# Patient Record
Sex: FEMALE | Race: OTHER | Employment: FULL TIME | URBAN - METROPOLITAN AREA
[De-identification: names, ages, dates, MRNs, and addresses within clinical notes are randomized per-mention and may not be internally consistent; named-entity substitution may affect disease eponyms.]

---

## 2017-04-17 ENCOUNTER — HOSPITAL ENCOUNTER (EMERGENCY)
Age: 22
Discharge: HOME | End: 2017-04-17
Attending: EMERGENCY MEDICINE
Payer: COMMERCIAL

## 2017-04-17 ENCOUNTER — APPOINTMENT (OUTPATIENT)
Dept: CT IMAGING | Age: 22
End: 2017-04-17
Payer: COMMERCIAL

## 2017-04-17 VITALS
OXYGEN SATURATION: 100 % | DIASTOLIC BLOOD PRESSURE: 73 MMHG | RESPIRATION RATE: 18 BRPM | TEMPERATURE: 98.7 F | HEART RATE: 60 BPM | HEIGHT: 63 IN | BODY MASS INDEX: 29.46 KG/M2 | WEIGHT: 166.25 LBS | SYSTOLIC BLOOD PRESSURE: 135 MMHG

## 2017-04-17 DIAGNOSIS — S01.01XA SCALP LACERATION, INITIAL ENCOUNTER: ICD-10-CM

## 2017-04-17 DIAGNOSIS — S09.90XA HEAD INJURY, INITIAL ENCOUNTER: Primary | ICD-10-CM

## 2017-04-17 PROCEDURE — 6370000000 HC RX 637 (ALT 250 FOR IP): Performed by: EMERGENCY MEDICINE

## 2017-04-17 PROCEDURE — 70450 CT HEAD/BRAIN W/O DYE: CPT

## 2017-04-17 PROCEDURE — 99283 EMERGENCY DEPT VISIT LOW MDM: CPT

## 2017-04-17 RX ADMIN — Medication: at 16:08

## 2017-04-17 ASSESSMENT — PAIN SCALES - GENERAL: PAINLEVEL_OUTOF10: 10

## 2017-04-17 ASSESSMENT — PAIN DESCRIPTION - DESCRIPTORS: DESCRIPTORS: ACHING

## 2017-04-17 ASSESSMENT — PAIN DESCRIPTION - PAIN TYPE: TYPE: ACUTE PAIN

## 2017-04-24 ENCOUNTER — HOSPITAL ENCOUNTER (EMERGENCY)
Age: 22
Discharge: HOME | End: 2017-04-24
Attending: EMERGENCY MEDICINE
Payer: COMMERCIAL

## 2017-04-24 VITALS
RESPIRATION RATE: 14 BRPM | SYSTOLIC BLOOD PRESSURE: 140 MMHG | OXYGEN SATURATION: 100 % | BODY MASS INDEX: 27.57 KG/M2 | WEIGHT: 171.52 LBS | TEMPERATURE: 98.2 F | HEART RATE: 68 BPM | HEIGHT: 66 IN | DIASTOLIC BLOOD PRESSURE: 96 MMHG

## 2017-04-24 DIAGNOSIS — Z48.02 ENCOUNTER FOR STAPLE REMOVAL: ICD-10-CM

## 2017-04-24 DIAGNOSIS — N39.0 URINARY TRACT INFECTION WITHOUT HEMATURIA, SITE UNSPECIFIED: Primary | ICD-10-CM

## 2017-04-24 LAB
-: ABNORMAL
AMORPHOUS: ABNORMAL
BACTERIA: ABNORMAL
BILIRUBIN URINE: NEGATIVE
CASTS UA: ABNORMAL /LPF
COLOR: YELLOW
COMMENT UA: ABNORMAL
CRYSTALS, UA: ABNORMAL /HPF
EPITHELIAL CELLS UA: ABNORMAL /HPF
GLUCOSE URINE: NEGATIVE
KETONES, URINE: NEGATIVE
LEUKOCYTE ESTERASE, URINE: ABNORMAL
MUCUS: ABNORMAL
NITRITE, URINE: NEGATIVE
OTHER OBSERVATIONS UA: ABNORMAL
PH UA: 8 (ref 5–8)
PROTEIN UA: ABNORMAL
RBC UA: ABNORMAL /HPF (ref 0–2)
RENAL EPITHELIAL, UA: ABNORMAL /HPF
SPECIFIC GRAVITY UA: 1.02 (ref 1–1.03)
TRICHOMONAS: ABNORMAL
TURBIDITY: ABNORMAL
URINE HGB: ABNORMAL
UROBILINOGEN, URINE: NORMAL
WBC UA: ABNORMAL /HPF (ref 0–5)
YEAST: ABNORMAL

## 2017-04-24 PROCEDURE — 84703 CHORIONIC GONADOTROPIN ASSAY: CPT

## 2017-04-24 PROCEDURE — 87186 SC STD MICRODIL/AGAR DIL: CPT

## 2017-04-24 PROCEDURE — 99283 EMERGENCY DEPT VISIT LOW MDM: CPT

## 2017-04-24 PROCEDURE — 87077 CULTURE AEROBIC IDENTIFY: CPT

## 2017-04-24 PROCEDURE — 81001 URINALYSIS AUTO W/SCOPE: CPT

## 2017-04-24 PROCEDURE — 87086 URINE CULTURE/COLONY COUNT: CPT

## 2017-04-24 RX ORDER — NITROFURANTOIN 25; 75 MG/1; MG/1
100 CAPSULE ORAL 2 TIMES DAILY
Qty: 20 CAPSULE | Refills: 0 | Status: SHIPPED | OUTPATIENT
Start: 2017-04-24 | End: 2017-05-04

## 2017-04-24 ASSESSMENT — ENCOUNTER SYMPTOMS
SORE THROAT: 0
COUGH: 0
NAUSEA: 0
SINUS PRESSURE: 0
CONSTIPATION: 0
WHEEZING: 0
DIARRHEA: 0
SHORTNESS OF BREATH: 0
COLOR CHANGE: 0
RHINORRHEA: 0
ABDOMINAL PAIN: 0
VOMITING: 0

## 2017-04-24 ASSESSMENT — PAIN SCALES - GENERAL: PAINLEVEL_OUTOF10: 8

## 2017-04-25 LAB
CULTURE: ABNORMAL
CULTURE: ABNORMAL
HCG, PREGNANCY URINE (POC): NEGATIVE
Lab: ABNORMAL
ORGANISM: ABNORMAL
SPECIMEN DESCRIPTION: ABNORMAL
SPECIMEN DESCRIPTION: ABNORMAL
STATUS: ABNORMAL

## 2017-08-09 ENCOUNTER — HOSPITAL ENCOUNTER (OUTPATIENT)
Age: 22
Setting detail: SPECIMEN
Discharge: HOME | End: 2017-08-09
Payer: MEDICAID

## 2017-08-09 ENCOUNTER — OFFICE VISIT (OUTPATIENT)
Dept: OBGYN CLINIC | Age: 22
End: 2017-08-09
Payer: COMMERCIAL

## 2017-08-09 VITALS
HEIGHT: 62 IN | SYSTOLIC BLOOD PRESSURE: 119 MMHG | HEART RATE: 79 BPM | DIASTOLIC BLOOD PRESSURE: 70 MMHG | RESPIRATION RATE: 16 BRPM | BODY MASS INDEX: 30.18 KG/M2 | WEIGHT: 164 LBS

## 2017-08-09 DIAGNOSIS — Z11.4 ENCOUNTER FOR SCREENING FOR HIV: ICD-10-CM

## 2017-08-09 DIAGNOSIS — R87.612 LOW GRADE SQUAMOUS INTRAEPITHELIAL LESION (LGSIL) ON PAPANICOLAOU SMEAR OF CERVIX: Primary | ICD-10-CM

## 2017-08-09 DIAGNOSIS — N76.0 ACUTE VAGINITIS: ICD-10-CM

## 2017-08-09 LAB — HIV AG/AB: NONREACTIVE

## 2017-08-09 PROCEDURE — 99213 OFFICE O/P EST LOW 20 MIN: CPT | Performed by: SPECIALIST

## 2017-08-09 RX ORDER — METRONIDAZOLE 500 MG/1
500 TABLET ORAL 2 TIMES DAILY
Qty: 14 TABLET | Refills: 0 | Status: SHIPPED | OUTPATIENT
Start: 2017-08-09 | End: 2017-08-16

## 2017-08-09 RX ORDER — FLUCONAZOLE 100 MG/1
100 TABLET ORAL DAILY
Qty: 7 TABLET | Refills: 0 | Status: SHIPPED | OUTPATIENT
Start: 2017-08-09 | End: 2017-08-16

## 2017-08-09 RX ORDER — VENLAFAXINE HYDROCHLORIDE 150 MG/1
150 CAPSULE, EXTENDED RELEASE ORAL DAILY
COMMUNITY
End: 2017-12-27

## 2017-08-09 ASSESSMENT — ENCOUNTER SYMPTOMS
EYE PAIN: 0
NAUSEA: 0
ABDOMINAL DISTENTION: 0
DIARRHEA: 0
ABDOMINAL PAIN: 0
CONSTIPATION: 0
COUGH: 0
VOMITING: 0
APNEA: 0

## 2017-08-21 LAB — CYTOLOGY REPORT: NORMAL

## 2017-12-14 ENCOUNTER — TELEPHONE (OUTPATIENT)
Dept: BARIATRICS/WEIGHT MGMT | Age: 22
End: 2017-12-14

## 2017-12-27 ENCOUNTER — HOSPITAL ENCOUNTER (EMERGENCY)
Age: 22
Discharge: HOME | End: 2017-12-27
Attending: EMERGENCY MEDICINE
Payer: COMMERCIAL

## 2017-12-27 VITALS
BODY MASS INDEX: 33.49 KG/M2 | WEIGHT: 189 LBS | TEMPERATURE: 97.1 F | HEART RATE: 68 BPM | OXYGEN SATURATION: 100 % | SYSTOLIC BLOOD PRESSURE: 134 MMHG | DIASTOLIC BLOOD PRESSURE: 59 MMHG | RESPIRATION RATE: 16 BRPM | HEIGHT: 63 IN

## 2017-12-27 DIAGNOSIS — B08.5 HERPANGINA: Primary | ICD-10-CM

## 2017-12-27 PROCEDURE — 96372 THER/PROPH/DIAG INJ SC/IM: CPT

## 2017-12-27 PROCEDURE — 6360000002 HC RX W HCPCS: Performed by: NURSE PRACTITIONER

## 2017-12-27 PROCEDURE — 99282 EMERGENCY DEPT VISIT SF MDM: CPT

## 2017-12-27 RX ORDER — TRAZODONE HYDROCHLORIDE 100 MG/1
100 TABLET ORAL NIGHTLY
COMMUNITY
End: 2018-11-01

## 2017-12-27 RX ORDER — GABAPENTIN 100 MG/1
100 CAPSULE ORAL 3 TIMES DAILY
COMMUNITY
End: 2018-04-11 | Stop reason: ALTCHOICE

## 2017-12-27 RX ORDER — DEXAMETHASONE SODIUM PHOSPHATE 10 MG/ML
10 INJECTION INTRAMUSCULAR; INTRAVENOUS ONCE
Status: COMPLETED | OUTPATIENT
Start: 2017-12-27 | End: 2017-12-27

## 2017-12-27 RX ADMIN — DEXAMETHASONE SODIUM PHOSPHATE 10 MG: 10 INJECTION INTRAMUSCULAR; INTRAVENOUS at 01:30

## 2017-12-27 ASSESSMENT — PAIN DESCRIPTION - LOCATION: LOCATION: THROAT

## 2017-12-27 ASSESSMENT — PAIN SCALES - GENERAL: PAINLEVEL_OUTOF10: 9

## 2017-12-27 ASSESSMENT — PAIN DESCRIPTION - PAIN TYPE: TYPE: ACUTE PAIN

## 2017-12-27 NOTE — ED NOTES
Pt presents to ED ambulatory with steady gait c/o of throat pain and bilateral ear pain for the past 2 days. Pt rates pain 9/10. Pt denies fever or chills.       Rogene Severance, RN  12/27/17 8835

## 2017-12-27 NOTE — ED PROVIDER NOTES
[address] [place] [address] ED  eMERGENCY dEPARTMENT eNCOUnter      Pt Name: Gideon Geiger  MRN: [id number]  [place] 1995  Date of evaluation: 12/27/2017  Provider: Dariana Daniel, NP    [address] [place] [address]       Chief Complaint   Patient presents with    Pharyngitis    Otalgia     bilateral          HISTORY OF PRESENT ILLNESS  (Location/Symptom, Timing/Onset, Context/Setting, Quality, Duration, Modifying Factors, Severity.)   Gideon Geiger is a 20 y.o. female who presents to the emergency department with a two day history of very sore throat, bilateral earache, stuffy nose and nonproductive cough. She states the symptoms have become worse. Swallowing aggravates the pain. She is still taking fluids and food without difficulty and swallowing. She is taken Robitussin for the symptoms without relief. No sick contacts. Nursing Notes were reviewed. 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 Venegas Melena    Depression     Hematuria     History of chest pain     Hyperinsulinism     Hypertension     Insulin resistance     Leg lesion     Microcytic anemia     Obesity     Pain     RIGHT KNEE/HX DISLOCATION SEVERAL TIMES    S/P laparoscopic sleeve gastrectomy 12-22-14    start wt = 237lb, Dr. Aviva Mcbride           Procedure Laterality Date    BARIATRIC SURGERY      OTHER SURGICAL HISTORY  12/22/14    laparascopic Robotic Gastrectomy sleeve 01:20:22 AM      PATIENT REFERRED TO:   No follow-up provider specified.     DISCHARGE MEDICATIONS:     Discharge Medication List as of 12/27/2017  1:21 AM      START taking these medications    Details   Magic Mouthwash (MIRACLE MOUTHWASH) Swish and spit 5 mLs 4 times daily as needed for Irritation, Disp-300 mL, R-0Print                 (Please note that portions of this note were completed with a voice recognition program.  Efforts were made to edit the dictations but occasionally words are mis-transcribed.)    Soheila Rivera NP  Certified Nurse Practitioner       Soheila Rivera NP  12/27/17 [phone]

## 2018-04-11 ENCOUNTER — OFFICE VISIT (OUTPATIENT)
Dept: FAMILY MEDICINE CLINIC | Age: 23
End: 2018-04-11
Payer: COMMERCIAL

## 2018-04-11 VITALS
HEART RATE: 86 BPM | RESPIRATION RATE: 24 BRPM | WEIGHT: 190 LBS | OXYGEN SATURATION: 99 % | SYSTOLIC BLOOD PRESSURE: 124 MMHG | BODY MASS INDEX: 33.66 KG/M2 | TEMPERATURE: 97.7 F | HEIGHT: 63 IN | DIASTOLIC BLOOD PRESSURE: 78 MMHG

## 2018-04-11 DIAGNOSIS — F50.9 SELF-INDUCED PURGING FOR WEIGHT LOSS: ICD-10-CM

## 2018-04-11 DIAGNOSIS — Z76.89 ENCOUNTER TO ESTABLISH CARE: ICD-10-CM

## 2018-04-11 DIAGNOSIS — Z13.1 SCREENING FOR DIABETES MELLITUS (DM): ICD-10-CM

## 2018-04-11 DIAGNOSIS — D69.1 DELTA STORAGE POOL DISEASE (HCC): Primary | Chronic | ICD-10-CM

## 2018-04-11 LAB — HBA1C MFR BLD: 5.4 %

## 2018-04-11 PROCEDURE — 99203 OFFICE O/P NEW LOW 30 MIN: CPT | Performed by: NURSE PRACTITIONER

## 2018-04-11 PROCEDURE — 83036 HEMOGLOBIN GLYCOSYLATED A1C: CPT | Performed by: NURSE PRACTITIONER

## 2018-04-11 RX ORDER — CARIPRAZINE 1.5 MG/1
CAPSULE, GELATIN COATED ORAL
Refills: 0 | COMMUNITY
Start: 2018-03-21 | End: 2018-11-01

## 2018-04-11 RX ORDER — ESCITALOPRAM OXALATE 20 MG/1
TABLET ORAL
Refills: 0 | COMMUNITY
Start: 2018-03-20 | End: 2018-11-01

## 2018-04-11 RX ORDER — LANOLIN ALCOHOL/MO/W.PET/CERES
325 CREAM (GRAM) TOPICAL 2 TIMES DAILY
Qty: 60 TABLET | Refills: 3 | Status: SHIPPED | OUTPATIENT
Start: 2018-04-11 | End: 2018-11-01

## 2018-04-11 ASSESSMENT — PATIENT HEALTH QUESTIONNAIRE - PHQ9
SUM OF ALL RESPONSES TO PHQ QUESTIONS 1-9: 0
1. LITTLE INTEREST OR PLEASURE IN DOING THINGS: 0
SUM OF ALL RESPONSES TO PHQ9 QUESTIONS 1 & 2: 0
2. FEELING DOWN, DEPRESSED OR HOPELESS: 0

## 2018-04-11 ASSESSMENT — ENCOUNTER SYMPTOMS
SHORTNESS OF BREATH: 0
COUGH: 0

## 2018-08-06 VITALS
HEART RATE: 72 BPM | WEIGHT: 201 LBS | RESPIRATION RATE: 16 BRPM | HEIGHT: 63 IN | DIASTOLIC BLOOD PRESSURE: 67 MMHG | OXYGEN SATURATION: 100 % | BODY MASS INDEX: 35.61 KG/M2 | TEMPERATURE: 98.5 F | SYSTOLIC BLOOD PRESSURE: 132 MMHG

## 2018-08-06 ASSESSMENT — PAIN DESCRIPTION - ORIENTATION: ORIENTATION: RIGHT

## 2018-08-06 ASSESSMENT — PAIN DESCRIPTION - ONSET: ONSET: ON-GOING

## 2018-08-06 ASSESSMENT — PAIN DESCRIPTION - FREQUENCY: FREQUENCY: CONTINUOUS

## 2018-08-06 ASSESSMENT — PAIN SCALES - GENERAL: PAINLEVEL_OUTOF10: 9

## 2018-08-06 ASSESSMENT — PAIN DESCRIPTION - PROGRESSION: CLINICAL_PROGRESSION: NOT CHANGED

## 2018-08-06 ASSESSMENT — PAIN DESCRIPTION - LOCATION: LOCATION: KNEE

## 2018-08-06 ASSESSMENT — PAIN DESCRIPTION - PAIN TYPE: TYPE: ACUTE PAIN

## 2018-08-07 ENCOUNTER — HOSPITAL ENCOUNTER (EMERGENCY)
Age: 23
Discharge: HOME | End: 2018-08-07
Attending: EMERGENCY MEDICINE

## 2018-08-07 ENCOUNTER — APPOINTMENT (OUTPATIENT)
Dept: GENERAL RADIOLOGY | Age: 23
End: 2018-08-07

## 2018-08-07 DIAGNOSIS — M23.91 INTERNAL DERANGEMENT OF KNEE, RIGHT: Primary | ICD-10-CM

## 2018-08-07 LAB — HCG, PREGNANCY URINE (POC): NEGATIVE

## 2018-08-07 PROCEDURE — 73562 X-RAY EXAM OF KNEE 3: CPT

## 2018-08-07 PROCEDURE — 6370000000 HC RX 637 (ALT 250 FOR IP): Performed by: NURSE PRACTITIONER

## 2018-08-07 PROCEDURE — 99283 EMERGENCY DEPT VISIT LOW MDM: CPT

## 2018-08-07 PROCEDURE — 84703 CHORIONIC GONADOTROPIN ASSAY: CPT

## 2018-08-07 RX ORDER — IBUPROFEN 800 MG/1
800 TABLET ORAL EVERY 8 HOURS PRN
Qty: 20 TABLET | Refills: 0 | Status: SHIPPED | OUTPATIENT
Start: 2018-08-07 | End: 2018-11-01

## 2018-08-07 RX ORDER — ACETAMINOPHEN AND CODEINE PHOSPHATE 300; 30 MG/1; MG/1
1 TABLET ORAL EVERY 6 HOURS PRN
Qty: 12 TABLET | Refills: 0 | Status: SHIPPED | OUTPATIENT
Start: 2018-08-07 | End: 2018-08-10

## 2018-08-07 RX ORDER — IBUPROFEN 800 MG/1
800 TABLET ORAL ONCE
Status: COMPLETED | OUTPATIENT
Start: 2018-08-07 | End: 2018-08-07

## 2018-08-07 RX ADMIN — IBUPROFEN 800 MG: 800 TABLET ORAL at 01:53

## 2018-08-07 ASSESSMENT — ENCOUNTER SYMPTOMS: COLOR CHANGE: 0

## 2018-08-07 ASSESSMENT — PAIN SCALES - GENERAL: PAINLEVEL_OUTOF10: 10

## 2018-08-07 NOTE — ED NOTES
Pt to tx room via wheelchair. Pt states that she \"dislocated\" her knee Saturday. Pt states that she was walking and twisted wrong. Pt states that she has done this in the past but it has always gotten better. Pt states that this time it is not getting better. Pt has knee wrapped in compression type bandage. NAD noted.       Conner Price RN  08/07/18 6344

## 2018-08-07 NOTE — ED PROVIDER NOTES
[facility] ED  eMERGENCY dEPARTMENT eNCOUnter      Pt Name: Nikko Taylor  MRN: [id number]  [place] 1995  Date of evaluation: 8/6/2018  Provider: Yazmin Chou       Chief Complaint   Patient presents with    Knee Pain     right knee. pt states she dislocated it on Saturday. hx of dislocation. back in place however still having severe pain         HISTORY OF PRESENT ILLNESS  (Location/Symptom, Timing/Onset, Context/Setting, Quality, Duration, Modifying Factors, Severity.)   Nikko Taylor is a 20 y.o. female who presents to the emergency department By private auto for evaluation of right knee pain and swelling. Patient states she was walking Saturday when her knee gave out on her and she dislocated it but it went back in on its own. .  Patient states she has a history of knee dislocations. She saw an orthopedic specialist several years ago but nobody recently. She rates her knee pain 9 out of 10. It is aggravated with walking. Nursing Notes were reviewed. 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 Angelo Romano    Depression     Hematuria     History of chest pain     Hyperinsulinism     Hypertension     Insulin resistance     Leg lesion     Microcytic anemia     Obesity     Pain     RIGHT KNEE/HX DISLOCATION SEVERAL TIMES    S/P laparoscopic sleeve gastrectomy 12-22-14    start wt = 237lb, Dr. Brian Cantu       Past Surgical History:   Procedure Laterality Date    BARIATRIC SURGERY      OTHER SURGICAL HISTORY  12/22/14    laparascopic Robotic Gastrectomy sleeve    TONSILLECTOMY           CURRENT MEDICATIONS       Previous Medications    ESCITALOPRAM (LEXAPRO) 20 MG TABLET    take 1 tablet by mouth once daily    FERROUS SULFATE (FE TABS) 325 (65 FE) MG EC TABLET    Take 1 tablet by mouth 2 Ear: External ear normal.   Nose: Nose normal.   Mouth/Throat: Oropharynx is clear and moist.   Eyes: Conjunctivae and EOM are normal. Pupils are equal, round, and reactive to light. Neck: Normal range of motion. Neck supple. Pulmonary/Chest: Effort normal. No respiratory distress. Musculoskeletal:        Right knee: She exhibits decreased range of motion and swelling. She exhibits no deformity. Tenderness found. Medial joint line and lateral joint line tenderness noted. Legs:  Neurological: She is alert and oriented to person, place, and time. She has normal reflexes. No cranial nerve deficit. Coordination normal.   Skin: Skin is warm, dry and intact. No ecchymosis and no rash noted. No erythema. Psychiatric: She has a normal mood and affect. Her behavior is normal. Judgment and thought content normal.         DIAGNOSTIC RESULTS     EKG: All EKG's are interpreted by the Emergency Department Physician who either signs or Co-signs this chart in the absence of a cardiologist.        RADIOLOGY:   Non-plain film images such as CT, Ultrasound and MRI are read by the radiologist. Micah Salazar radiographic images are visualized and preliminarily interpreted by the emergency physician with the below findings:    Xr Knee Right (3 Views)    Result Date: 8/7/2018  EXAMINATION: 3 XRAY VIEWS OF THE RIGHT KNEE 8/7/2018 12:38 am COMPARISON: August 9, 2016 HISTORY: ORDERING SYSTEM PROVIDED HISTORY: Pain and swelling ×3 days. Patient states she dislocated her knee Saturday. TECHNOLOGIST PROVIDED HISTORY: Reason for exam:->Pain and swelling ×3 days. Patient states she dislocated her knee Saturday. Ordering Physician Provided Reason for Exam: Rt knee pain and swelling Acuity: Acute Type of Exam: Initial Mechanism of Injury: dislocation Sat FINDINGS: Stable tibial tubercle minimally distracted fragment which may be sequela of remote Osgood-Schlatter disease. No evidence of acute fracture or dislocation.   No focal osseous ACETAMINOPHEN-CODEINE (TYLENOL/CODEINE #3) 300-30 MG PER TABLET    Take 1 tablet by mouth every 6 hours as needed for Pain for up to 3 days. .    IBUPROFEN (ADVIL;MOTRIN) 800 MG TABLET    Take 1 tablet by mouth every 8 hours as needed for Pain     Electronically signed by BENY Campuzano - CNP on 8/7/2018 at 2:07 AM           BENY Campuzano - CNP  08/07/18 [id number]

## 2018-11-01 ENCOUNTER — OFFICE VISIT (OUTPATIENT)
Dept: FAMILY MEDICINE CLINIC | Age: 23
End: 2018-11-01
Payer: COMMERCIAL

## 2018-11-01 VITALS
SYSTOLIC BLOOD PRESSURE: 120 MMHG | HEART RATE: 83 BPM | WEIGHT: 204.8 LBS | TEMPERATURE: 98.6 F | DIASTOLIC BLOOD PRESSURE: 62 MMHG | OXYGEN SATURATION: 97 % | BODY MASS INDEX: 36.28 KG/M2

## 2018-11-01 DIAGNOSIS — Z13.1 SCREENING FOR DIABETES MELLITUS: ICD-10-CM

## 2018-11-01 DIAGNOSIS — M25.561 CHRONIC PAIN OF RIGHT KNEE: Primary | ICD-10-CM

## 2018-11-01 DIAGNOSIS — G89.29 CHRONIC PAIN OF RIGHT KNEE: Primary | ICD-10-CM

## 2018-11-01 PROBLEM — F33.2 SEVERE RECURRENT MAJOR DEPRESSION WITHOUT PSYCHOTIC FEATURES (HCC): Status: ACTIVE | Noted: 2017-05-03

## 2018-11-01 PROBLEM — D50.9 IRON DEFICIENCY ANEMIA: Status: ACTIVE | Noted: 2017-05-04
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
## 2018-11-01 LAB — HBA1C MFR BLD: 5.2 %

## 2018-11-01 PROCEDURE — 99213 OFFICE O/P EST LOW 20 MIN: CPT | Performed by: NURSE PRACTITIONER

## 2018-11-01 PROCEDURE — 83036 HEMOGLOBIN GLYCOSYLATED A1C: CPT | Performed by: NURSE PRACTITIONER

## 2018-11-01 ASSESSMENT — ENCOUNTER SYMPTOMS
SHORTNESS OF BREATH: 0
COUGH: 0

## 2018-11-07 ENCOUNTER — OFFICE VISIT (OUTPATIENT)
Dept: ORTHOPEDIC SURGERY | Age: 23
End: 2018-11-07
Payer: COMMERCIAL

## 2018-11-07 VITALS
BODY MASS INDEX: 35.44 KG/M2 | HEIGHT: 63 IN | DIASTOLIC BLOOD PRESSURE: 69 MMHG | SYSTOLIC BLOOD PRESSURE: 120 MMHG | WEIGHT: 200 LBS | HEART RATE: 63 BPM

## 2018-11-07 DIAGNOSIS — S83.241A ACUTE MEDIAL MENISCUS TEAR OF RIGHT KNEE, INITIAL ENCOUNTER: Primary | ICD-10-CM

## 2018-11-07 PROCEDURE — 99203 OFFICE O/P NEW LOW 30 MIN: CPT | Performed by: ORTHOPAEDIC SURGERY

## 2018-11-15 ENCOUNTER — TELEPHONE (OUTPATIENT)
Dept: ORTHOPEDIC SURGERY | Age: 23
End: 2018-11-15

## 2018-12-12 ENCOUNTER — OFFICE VISIT (OUTPATIENT)
Dept: ORTHOPEDIC SURGERY | Age: 23
End: 2018-12-12
Payer: COMMERCIAL

## 2018-12-12 VITALS — HEIGHT: 62 IN | BODY MASS INDEX: 36.8 KG/M2 | WEIGHT: 200 LBS

## 2018-12-12 DIAGNOSIS — M25.561 PATELLOFEMORAL ARTHRALGIA OF RIGHT KNEE: Primary | ICD-10-CM

## 2018-12-12 PROCEDURE — 99213 OFFICE O/P EST LOW 20 MIN: CPT | Performed by: ORTHOPAEDIC SURGERY

## 2018-12-12 NOTE — PROGRESS NOTES
Subjective:      Patient ID: Mirza Mcclain is a 20 y.o. female. HPI  Patient comes in for further evaluation of her right knee. Continues to have diffuse pain medial and lateral.  Worse with weightbearing. Deep achy pain that does not radiate. No current outpatient prescriptions on file. No current facility-administered medications for this visit. Review of Systems   Musculoskeletal: Positive for arthralgias, gait problem and joint swelling. 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 Lupe Ulrich    Depression     Hematuria     History of chest pain     Hyperinsulinism     Hypertension     Insulin resistance     Leg lesion     Microcytic anemia     Obesity     Pain     RIGHT KNEE/HX DISLOCATION SEVERAL TIMES    S/P laparoscopic sleeve gastrectomy 12-22-14    start wt = 237lb, Dr. Kate Vargas     Past Surgical History:   Procedure Laterality Date    BARIATRIC SURGERY      OTHER SURGICAL HISTORY  12/22/14    laparascopic Robotic Gastrectomy sleeve    TONSILLECTOMY       Family History   Problem Relation Age of Onset    Bipolar Disorder Mother     Arthritis Mother     Depression Mother     High Blood Pressure Mother     High Cholesterol Mother     Mental Illness Mother     Stroke Mother     Clotting Disorder Father     Depression Sister     Diabetes Maternal Aunt     Cancer Maternal Grandmother         pheochromocytoma- 61     Social History   Substance Use Topics    Smoking status: Former Smoker     Packs/day: 0.50     Years: 1.00     Types: Cigarettes     Quit date: 6/1/2014    Smokeless tobacco: Never Used    Alcohol use Yes      Comment: social       Objective:     Vitals:    12/12/18 0937   Weight: 200 lb (90.7 kg)   Height: 5' 2\" (1.575 m)     Physical Exam  On exam patient's alert and oriented ×3 and appears well kempt she walks with a normal gait.   Exam of

## 2018-12-26 ENCOUNTER — OFFICE VISIT (OUTPATIENT)
Dept: FAMILY MEDICINE CLINIC | Age: 23
End: 2018-12-26
Payer: COMMERCIAL

## 2018-12-26 VITALS
TEMPERATURE: 98 F | OXYGEN SATURATION: 98 % | SYSTOLIC BLOOD PRESSURE: 115 MMHG | DIASTOLIC BLOOD PRESSURE: 70 MMHG | BODY MASS INDEX: 37.7 KG/M2 | HEART RATE: 70 BPM | WEIGHT: 212.8 LBS | HEIGHT: 63 IN

## 2018-12-26 DIAGNOSIS — J01.00 ACUTE NON-RECURRENT MAXILLARY SINUSITIS: Primary | ICD-10-CM

## 2018-12-26 PROCEDURE — 99213 OFFICE O/P EST LOW 20 MIN: CPT | Performed by: FAMILY MEDICINE

## 2018-12-26 RX ORDER — AZITHROMYCIN 250 MG/1
TABLET, FILM COATED ORAL
Qty: 1 PACKET | Refills: 0 | Status: SHIPPED | OUTPATIENT
Start: 2018-12-26 | End: 2019-11-15 | Stop reason: CLARIF

## 2018-12-26 RX ORDER — ESCITALOPRAM OXALATE 20 MG/1
TABLET ORAL
Refills: 0 | COMMUNITY
Start: 2018-11-16 | End: 2019-11-15 | Stop reason: CLARIF

## 2018-12-26 RX ORDER — GUAIFENESIN 600 MG/1
600 TABLET, EXTENDED RELEASE ORAL 2 TIMES DAILY
Qty: 14 TABLET | Refills: 1 | Status: SHIPPED | OUTPATIENT
Start: 2018-12-26 | End: 2019-01-02

## 2018-12-26 RX ORDER — ERGOCALCIFEROL 1.25 MG/1
CAPSULE ORAL
Refills: 0 | COMMUNITY
Start: 2018-11-16 | End: 2019-11-15 | Stop reason: CLARIF

## 2018-12-26 RX ORDER — TRAZODONE HYDROCHLORIDE 100 MG/1
TABLET ORAL
Refills: 0 | COMMUNITY
Start: 2018-11-16 | End: 2019-11-15 | Stop reason: CLARIF

## 2018-12-26 RX ORDER — FLUTICASONE PROPIONATE 50 MCG
1 SPRAY, SUSPENSION (ML) NASAL 2 TIMES DAILY
Qty: 1 BOTTLE | Refills: 2 | Status: SHIPPED | OUTPATIENT
Start: 2018-12-26 | End: 2019-11-15 | Stop reason: CLARIF

## 2018-12-26 ASSESSMENT — ENCOUNTER SYMPTOMS
EYES NEGATIVE: 1
RHINORRHEA: 1
SHORTNESS OF BREATH: 0
HOARSE VOICE: 1
SINUS PAIN: 1
ALLERGIC/IMMUNOLOGIC NEGATIVE: 1
SINUS PRESSURE: 1
SWOLLEN GLANDS: 1
GASTROINTESTINAL NEGATIVE: 1
COUGH: 1
SORE THROAT: 1

## 2018-12-26 NOTE — PROGRESS NOTES
RECEIVING PLATELETS PREOP PER DR. Vidhya Grey    Depression     Hematuria     History of chest pain     Hyperinsulinism     Hypertension     Insulin resistance     Leg lesion     Microcytic anemia     Obesity     Pain     RIGHT KNEE/HX DISLOCATION SEVERAL TIMES    S/P laparoscopic sleeve gastrectomy 12-22-14    start wt = 237lb, Dr. Virginia Blankenship      Past Surgical History:   Procedure Laterality Date    BARIATRIC SURGERY      OTHER SURGICAL HISTORY  12/22/14    laparascopic Robotic Gastrectomy sleeve    TONSILLECTOMY         Family History   Problem Relation Age of Onset    Bipolar Disorder Mother     Arthritis Mother     Depression Mother     High Blood Pressure Mother     High Cholesterol Mother     Mental Illness Mother     Stroke Mother     Clotting Disorder Father     Depression Sister     Diabetes Maternal Aunt     Cancer Maternal Grandmother         pheochromocytoma- 61       Social History   Substance Use Topics    Smoking status: Former Smoker     Packs/day: 0.50     Years: 1.00     Types: Cigarettes     Quit date: 6/1/2014    Smokeless tobacco: Never Used    Alcohol use Yes      Comment: social      Current Outpatient Prescriptions   Medication Sig Dispense Refill    vitamin D (ERGOCALCIFEROL) 63068 units CAPS capsule take 1 capsule by mouth every week  0    escitalopram (LEXAPRO) 20 MG tablet take 1 tablet by mouth daily  0    traZODone (DESYREL) 100 MG tablet take 1 tablet by mouth at bedtime  0    azithromycin (ZITHROMAX Z-AMARJIT) 250 MG tablet Take 2 tablets (500 mg) on Day 1, and then take 1 tablet (250 mg) on days 2 through 5. 1 packet 0    fluticasone (FLONASE) 50 MCG/ACT nasal spray 1 spray by Nasal route 2 times daily for 14 days 1 Bottle 2    guaiFENesin (MUCINEX) 600 MG extended release tablet Take 1 tablet by mouth 2 times daily for 7 days 14 tablet 1     No current facility-administered medications for this visit.       Allergies   Allergen Reactions    Pcn and time. She has normal reflexes. Skin: Skin is warm and dry. Psychiatric: She has a normal mood and affect. Her behavior is normal. Judgment and thought content normal.   Nursing note and vitals reviewed. /70 (Site: Left Upper Arm, Position: Sitting)   Pulse 70   Temp 98 °F (36.7 °C)   Ht 5' 3\" (1.6 m)   Wt 212 lb 12.8 oz (96.5 kg)   SpO2 98%   BMI 37.70 kg/m²     Assessment:       Diagnosis Orders   1. Acute non-recurrent maxillary sinusitis  azithromycin (ZITHROMAX Z-AMARJIT) 250 MG tablet    fluticasone (FLONASE) 50 MCG/ACT nasal spray    guaiFENesin (MUCINEX) 600 MG extended release tablet             Plan:      Return if symptoms worsen or fail to improve. Follow up when necessary. No orders of the defined types were placed in this encounter. Orders Placed This Encounter   Medications    azithromycin (ZITHROMAX Z-AMARJIT) 250 MG tablet     Sig: Take 2 tablets (500 mg) on Day 1, and then take 1 tablet (250 mg) on days 2 through 5. Dispense:  1 packet     Refill:  0    fluticasone (FLONASE) 50 MCG/ACT nasal spray     Si spray by Nasal route 2 times daily for 14 days     Dispense:  1 Bottle     Refill:  2    guaiFENesin (MUCINEX) 600 MG extended release tablet     Sig: Take 1 tablet by mouth 2 times daily for 7 days     Dispense:  14 tablet     Refill:  1       Patient given educational materials - see patient instructions. Discussed use, benefit, and side effects of prescribed medications. All patientquestions answered. Pt voiced understanding. Reviewed health maintenance. Instructedto continue current medications, diet and exercise. Patient agreed with treatmentplan. Follow up as directed.      Electronically signed by Mela Escobedo MD on 2018 at 11:57 AM

## 2019-01-16 ENCOUNTER — TELEPHONE (OUTPATIENT)
Dept: BARIATRICS/WEIGHT MGMT | Age: 24
End: 2019-01-16

## 2019-11-15 ENCOUNTER — OFFICE VISIT (OUTPATIENT)
Dept: FAMILY MEDICINE CLINIC | Age: 24
End: 2019-11-15
Payer: COMMERCIAL

## 2019-11-15 VITALS
HEART RATE: 70 BPM | HEIGHT: 63 IN | BODY MASS INDEX: 34.84 KG/M2 | OXYGEN SATURATION: 100 % | TEMPERATURE: 97 F | SYSTOLIC BLOOD PRESSURE: 119 MMHG | DIASTOLIC BLOOD PRESSURE: 70 MMHG | WEIGHT: 196.6 LBS

## 2019-11-15 DIAGNOSIS — N64.4 BREAST PAIN, RIGHT: Primary | ICD-10-CM

## 2019-11-15 PROCEDURE — 99213 OFFICE O/P EST LOW 20 MIN: CPT | Performed by: INTERNAL MEDICINE

## 2019-12-19 ENCOUNTER — TELEPHONE (OUTPATIENT)
Dept: BARIATRICS/WEIGHT MGMT | Age: 24
End: 2019-12-19

## 2020-01-15 ENCOUNTER — OFFICE VISIT (OUTPATIENT)
Dept: FAMILY MEDICINE CLINIC | Age: 25
End: 2020-01-15
Payer: COMMERCIAL

## 2020-01-15 VITALS
OXYGEN SATURATION: 98 % | DIASTOLIC BLOOD PRESSURE: 78 MMHG | SYSTOLIC BLOOD PRESSURE: 119 MMHG | HEIGHT: 63 IN | HEART RATE: 77 BPM | BODY MASS INDEX: 33.06 KG/M2 | WEIGHT: 186.6 LBS

## 2020-01-15 PROCEDURE — 99213 OFFICE O/P EST LOW 20 MIN: CPT | Performed by: INTERNAL MEDICINE

## 2020-01-15 NOTE — PROGRESS NOTES
right  Continue lifestyle management   Will repeat breast exam with pap smear - pt plans to schedule today         No follow-ups on file. Patient given educational materials- see patient instructions. Discussed use, benefit, and side effects of prescribedmedications. All patient questions answered. Pt voiced understanding. Reviewedhealth maintenance. Instructed to continue current medications, diet and exercise. Patient agreed with treatment plan. Follow up as directed.      Electronically signedby Mansi Gates MD on 1/15/2020

## 2020-04-30 ENCOUNTER — TELEPHONE (OUTPATIENT)
Dept: FAMILY MEDICINE CLINIC | Age: 25
End: 2020-04-30

## 2020-04-30 DIAGNOSIS — N63.0 BREAST LUMP: Primary | ICD-10-CM

## 2020-05-11 ENCOUNTER — HOSPITAL ENCOUNTER (OUTPATIENT)
Dept: WOMENS IMAGING | Age: 25
Discharge: HOME | End: 2020-05-13
Payer: COMMERCIAL

## 2020-05-11 PROCEDURE — 76642 ULTRASOUND BREAST LIMITED: CPT

## 2020-09-05 ENCOUNTER — HOSPITAL ENCOUNTER (EMERGENCY)
Age: 25
Discharge: HOME | End: 2020-09-05
Attending: EMERGENCY MEDICINE
Payer: COMMERCIAL

## 2020-09-05 VITALS
BODY MASS INDEX: 29.39 KG/M2 | RESPIRATION RATE: 16 BRPM | TEMPERATURE: 99.2 F | DIASTOLIC BLOOD PRESSURE: 59 MMHG | SYSTOLIC BLOOD PRESSURE: 147 MMHG | OXYGEN SATURATION: 100 % | HEIGHT: 63 IN | WEIGHT: 165.9 LBS | HEART RATE: 71 BPM

## 2020-09-05 LAB
-: NORMAL
AMORPHOUS: NORMAL
BACTERIA: NORMAL
BILIRUBIN URINE: NEGATIVE
CASTS UA: NORMAL /LPF
CHP ED QC CHECK: NORMAL
COLOR: YELLOW
COMMENT UA: ABNORMAL
CRYSTALS, UA: NORMAL /HPF
EPITHELIAL CELLS UA: NORMAL /HPF (ref 0–5)
GLUCOSE URINE: NEGATIVE
KETONES, URINE: NEGATIVE
LEUKOCYTE ESTERASE, URINE: ABNORMAL
MUCUS: NORMAL
NITRITE, URINE: NEGATIVE
OTHER OBSERVATIONS UA: NORMAL
PH UA: 6.5 (ref 5–8)
PREGNANCY TEST URINE, POC: NEGATIVE
PROTEIN UA: ABNORMAL
RBC UA: NORMAL /HPF (ref 0–2)
RENAL EPITHELIAL, UA: NORMAL /HPF
SPECIFIC GRAVITY UA: 1.02 (ref 1–1.03)
TRICHOMONAS: NORMAL
TURBIDITY: ABNORMAL
URINE HGB: ABNORMAL
UROBILINOGEN, URINE: NORMAL
WBC UA: NORMAL /HPF (ref 0–5)
YEAST: NORMAL

## 2020-09-05 PROCEDURE — 99283 EMERGENCY DEPT VISIT LOW MDM: CPT

## 2020-09-05 PROCEDURE — 87086 URINE CULTURE/COLONY COUNT: CPT

## 2020-09-05 PROCEDURE — 81001 URINALYSIS AUTO W/SCOPE: CPT

## 2020-09-05 PROCEDURE — 87186 SC STD MICRODIL/AGAR DIL: CPT

## 2020-09-05 PROCEDURE — 87088 URINE BACTERIA CULTURE: CPT

## 2020-09-05 PROCEDURE — 6370000000 HC RX 637 (ALT 250 FOR IP): Performed by: EMERGENCY MEDICINE

## 2020-09-05 PROCEDURE — 96372 THER/PROPH/DIAG INJ SC/IM: CPT

## 2020-09-05 PROCEDURE — 6360000002 HC RX W HCPCS: Performed by: EMERGENCY MEDICINE

## 2020-09-05 PROCEDURE — 81025 URINE PREGNANCY TEST: CPT

## 2020-09-05 RX ORDER — PHENAZOPYRIDINE HYDROCHLORIDE 200 MG/1
200 TABLET, FILM COATED ORAL 3 TIMES DAILY PRN
Qty: 6 TABLET | Refills: 0 | Status: SHIPPED | OUTPATIENT
Start: 2020-09-05 | End: 2020-09-08

## 2020-09-05 RX ORDER — KETOROLAC TROMETHAMINE 30 MG/ML
30 INJECTION, SOLUTION INTRAMUSCULAR; INTRAVENOUS ONCE
Status: COMPLETED | OUTPATIENT
Start: 2020-09-05 | End: 2020-09-05

## 2020-09-05 RX ORDER — LEVOFLOXACIN 250 MG/1
500 TABLET ORAL ONCE
Status: COMPLETED | OUTPATIENT
Start: 2020-09-05 | End: 2020-09-05

## 2020-09-05 RX ORDER — LEVOFLOXACIN 500 MG/1
500 TABLET, FILM COATED ORAL DAILY
Qty: 7 TABLET | Refills: 0 | Status: SHIPPED | OUTPATIENT
Start: 2020-09-05 | End: 2020-09-12

## 2020-09-05 RX ORDER — HYDROCODONE BITARTRATE AND ACETAMINOPHEN 5; 325 MG/1; MG/1
1 TABLET ORAL EVERY 6 HOURS PRN
Qty: 10 TABLET | Refills: 0 | Status: SHIPPED | OUTPATIENT
Start: 2020-09-05 | End: 2020-09-08

## 2020-09-05 RX ORDER — IBUPROFEN 600 MG/1
600 TABLET ORAL EVERY 6 HOURS PRN
Qty: 20 TABLET | Refills: 0 | Status: SHIPPED | OUTPATIENT
Start: 2020-09-05 | End: 2021-03-26 | Stop reason: ALTCHOICE

## 2020-09-05 RX ORDER — PHENAZOPYRIDINE HYDROCHLORIDE 200 MG/1
200 TABLET, FILM COATED ORAL ONCE
Status: COMPLETED | OUTPATIENT
Start: 2020-09-05 | End: 2020-09-05

## 2020-09-05 RX ORDER — ACETAMINOPHEN 500 MG
1000 TABLET ORAL ONCE
Status: COMPLETED | OUTPATIENT
Start: 2020-09-05 | End: 2020-09-05

## 2020-09-05 RX ORDER — ONDANSETRON 4 MG/1
4 TABLET, ORALLY DISINTEGRATING ORAL ONCE
Status: COMPLETED | OUTPATIENT
Start: 2020-09-05 | End: 2020-09-05

## 2020-09-05 RX ADMIN — PHENAZOPYRIDINE 200 MG: 200 TABLET ORAL at 05:20

## 2020-09-05 RX ADMIN — ONDANSETRON 4 MG: 4 TABLET, ORALLY DISINTEGRATING ORAL at 05:20

## 2020-09-05 RX ADMIN — LEVOFLOXACIN 500 MG: 250 TABLET, FILM COATED ORAL at 05:20

## 2020-09-05 RX ADMIN — ACETAMINOPHEN 1000 MG: 500 TABLET ORAL at 05:20

## 2020-09-05 RX ADMIN — KETOROLAC TROMETHAMINE 30 MG: 30 INJECTION, SOLUTION INTRAMUSCULAR at 05:20

## 2020-09-05 ASSESSMENT — PAIN SCALES - GENERAL: PAINLEVEL_OUTOF10: 6

## 2020-09-05 NOTE — ED NOTES
Discharge instructions reviewed with patient. Pt denies any further need at this time. Pt voiced understanding and agreeable to plan of care. Pt ambulated out of department without difficulty.      Juan Ansari RN  09/05/20 6677

## 2020-09-05 NOTE — ED PROVIDER NOTES
[address] [place] [address] ED  eMERGENCY dEPARTMENT eNCOUnter      Pt Name: Rip Sandy  MRN: [id number]  [place] 1995  Date of evaluation: 9/5/2020  Provider: Vincent Forde MD    CHIEF COMPLAINT       Chief Complaint   Patient presents with    Dysuria     pressure, pain, and urgency with small amount of urine output x last 24h         HISTORY OF PRESENT ILLNESS  (Location/Symptom, Timing/Onset, Context/Setting, Quality, Duration, Modifying Factors, Severity.)   Rip Sandy is a 21 y.o. female who presents to the emergency department for evaluation of acute onset of dysuria urinary urgency frequency as well as subjective fevers chills. Patient states symptoms are consistent with her previous episodes of UTI. No nausea or vomiting no flank pain. She denies pregnancy. She denies STD concern. Nursing Notes were reviewed. 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 Henry Mccullough    Depression     Hematuria     History of chest pain     Hyperinsulinism     Hypertension     Insulin resistance     Leg lesion     Microcytic anemia     Obesity     Pain     RIGHT KNEE/HX DISLOCATION SEVERAL TIMES    S/P laparoscopic sleeve gastrectomy 12-22-14    start wt = 237lb, Ese Ferrer           Procedure Laterality Date    BARIATRIC SURGERY      OTHER SURGICAL HISTORY  12/22/14    laparascopic Robotic Gastrectomy sleeve    TONSILLECTOMY           FAMILY HISTORY           Problem Relation Age of Onset    Bipolar Disorder Mother     Arthritis Mother     Depression Mother     High Blood Pressure Mother     High Cholesterol Mother     Mental Illness Mother     Stroke Mother     Clotting Disorder Father     Depression Sister     Diabetes Maternal Aunt     Cancer Maternal Grandmother         pheochromocytoma- 61     Family Status   Relation Name Status    Mother  Alive    Father  [de-identified]    Sister  (Not Specified)   Reuben Barker  (Not Specified)    MGM      MGF      [address]      PGF          SOCIAL HISTORY      reports that she quit smoking about 6 years ago. Her smoking use included cigarettes. She has a 0.50 pack-year smoking history. She has never used smokeless tobacco. She reports current alcohol use. She reports that she does not use drugs. REVIEW OF SYSTEMS    (2-9 systems for level 4, 10 or more for level 5)   Review of Systems   All other systems reviewed and are negative. Except as noted above the remainder of the review of systems was reviewed and negative. PHYSICAL EXAM    (up to 7 for level 4, 8 or more for level 5)     Vitals:    20   BP: (!) 147/59   Pulse: 71   Resp: 16   Temp: 99.2 °F (37.3 °C)   TempSrc: Oral   SpO2: 100%   Weight: 165 lb 14.4 oz (75.3 kg)   Height: 5' 2.5\" (1.588 m)       Physical exam reflects a pleasant well-nourished well-hydrated female with low-grade temp. Vital signs otherwise stable to include pulse ox of 100% on room air. She is not hypoxic. She is alert conversive and appropriate behavior. Integument warm and dry. Oral pharyngeal exam without lesion. No difficulty breathing speaking or swallowing. Heart regular rate and rhythm lungs are clear abdomen is soft throughout no flank pain. Extremities without abnormality. Visualized integument without rash or lesion    DIAGNOSTIC RESULTS         LABS:  Labs Reviewed   CULTURE, URINE   URINALYSIS   POCT URINE PREGNANCY       All other labs were within normal range or not returned as of this dictation.     EMERGENCY DEPARTMENT COURSE and DIFFERENTIAL DIAGNOSIS/MDM:   Vitals:    Vitals:    207   BP: (!) 147/59   Pulse: 71   Resp: 16   Temp: 99.2 °F (37.3 °C)   TempSrc: Oral   SpO2: 100% Weight: 165 lb 14.4 oz (75.3 kg)   Height: 5' 2.5\" (1.588 m)     Patient is evaluated. Urinalysis collected reviewed and sent for culture. Pregnancy test confirmed negative. Patient's previous cultures have grown Klebsiella susceptible to quinolones. She is penicillin allergic. She is treated symptomatically and placed on Levaquin at this time pending return of cultures    CONSULTS:  None    PROCEDURES:  None    FINAL IMPRESSION      1. Acute cystitis with hematuria          DISPOSITION/PLAN   DISPOSITION Decision To Discharge 09/05/2020 05:07:06 AM      PATIENT REFERRED TO:   Braeden Bailey, APRN - CNP  [address] [address]  [phone]    In 3 days  If symptoms worsen    [facility] ED  [address] [facility]  [phone]    As needed, If symptoms worsen      DISCHARGE MEDICATIONS:     New Prescriptions    HYDROCODONE-ACETAMINOPHEN (NORCO) 5-325 MG PER TABLET    Take 1 tablet by mouth every 6 hours as needed for Pain for up to 3 days. IBUPROFEN (ADVIL;MOTRIN) 600 MG TABLET    Take 1 tablet by mouth every 6 hours as needed for Pain    LEVOFLOXACIN (LEVAQUIN) 500 MG TABLET    Take 1 tablet by mouth daily for 7 days    PHENAZOPYRIDINE (PYRIDIUM) 200 MG TABLET    Take 1 tablet by mouth 3 times daily as needed for Pain (bladder spasm/pain)     Controlled Substance Monitoring:    Acute and Chronic Pain Monitoring:   RX Monitoring 9/5/2020   Attestation -   Periodic Controlled Substance Monitoring No signs of potential drug abuse or diversion identified.            (Please note that portions of this note were completed with a voice recognition program.  Efforts were made to edit the dictations but occasionally words are mis-transcribed.)    Cherie Perry MD  Attending Emergency Physician          Cherie Perry MD  09/05/20 0131

## 2020-09-06 LAB
CULTURE: ABNORMAL
Lab: ABNORMAL
SPECIMEN DESCRIPTION: ABNORMAL

## 2020-09-08 LAB — HCG, PREGNANCY URINE (POC): NEGATIVE

## 2020-09-09 ENCOUNTER — CARE COORDINATION (OUTPATIENT)
Dept: OTHER | Facility: CLINIC | Age: 25
End: 2020-09-09

## 2020-11-09 ENCOUNTER — OFFICE VISIT (OUTPATIENT)
Dept: FAMILY MEDICINE CLINIC | Age: 25
End: 2020-11-09
Payer: COMMERCIAL

## 2020-11-09 VITALS
WEIGHT: 165 LBS | BODY MASS INDEX: 29.7 KG/M2 | DIASTOLIC BLOOD PRESSURE: 60 MMHG | HEART RATE: 67 BPM | TEMPERATURE: 98.2 F | SYSTOLIC BLOOD PRESSURE: 110 MMHG | OXYGEN SATURATION: 100 %

## 2020-11-09 PROCEDURE — 99395 PREV VISIT EST AGE 18-39: CPT | Performed by: NURSE PRACTITIONER

## 2020-11-09 ASSESSMENT — ENCOUNTER SYMPTOMS
SINUS PRESSURE: 0
CHEST TIGHTNESS: 0
EYE DISCHARGE: 0
CONSTIPATION: 0
DIARRHEA: 0
SHORTNESS OF BREATH: 0
BLOOD IN STOOL: 0
RHINORRHEA: 0
COUGH: 0
ABDOMINAL PAIN: 0

## 2020-11-09 NOTE — PROGRESS NOTES
2020    Fabiana Hill (:  1995) is a 21 y.o. female, here for a preventive medicine evaluation. Patient Active Problem List   Diagnosis    Delta storage pool disease    Obesity    Depression    Hematuria    S/P laparoscopic sleeve gastrectomy    Self-induced purging for weight loss    Iron deficiency anemia    Myopia    Severe recurrent major depression without psychotic features ([facility] [address]    Suicide gesture ([facility] [address]     Wt Readings from Last 3 Encounters:   20 165 lb (74.8 kg)   20 165 lb 14.4 oz (75.3 kg)   01/15/20 186 lb 9.6 oz (84.6 kg)         Review of Systems   Constitutional: Negative for activity change, appetite change, chills, fatigue and fever. HENT: Negative for congestion, ear pain, rhinorrhea and sinus pressure. Eyes: Negative for discharge and visual disturbance. Respiratory: Negative for cough, chest tightness and shortness of breath. Cardiovascular: Negative for chest pain, palpitations and leg swelling. Gastrointestinal: Negative for abdominal pain, blood in stool, constipation and diarrhea. Endocrine: Negative for cold intolerance and heat intolerance. Genitourinary: Negative for difficulty urinating and hematuria. Musculoskeletal: Negative for arthralgias and myalgias. Skin: Negative for rash. Neurological: Negative for dizziness, light-headedness and headaches. Psychiatric/Behavioral: Negative for dysphoric mood and self-injury. Prior to Visit Medications    Medication Sig Taking? Authorizing Provider   ibuprofen (ADVIL;MOTRIN) 600 MG tablet Take 1 tablet by mouth every 6 hours as needed for Pain  Patient not taking: Reported on 2020  Jennifer Romero MD        Allergies   Allergen Reactions    Pcn [Penicillins] Hives       Past Medical History:   Diagnosis Date    Delta storage pool disease [facility]) 2011    1.36 DG/PL/PT STATES RECEIVING PLATELETS PREOP PER DR. Haile Mendez    Depression     Hematuria     History of chest pain     Hyperinsulinism     Hypertension     Insulin resistance     Leg lesion     Microcytic anemia     Obesity     Pain     RIGHT KNEE/HX DISLOCATION SEVERAL TIMES    S/P laparoscopic sleeve gastrectomy 14    start wt = 237lb, Dr. Claudette Moons       Past Surgical History:   Procedure Laterality Date    BARIATRIC SURGERY      OTHER SURGICAL HISTORY  14    laparascopic Robotic Gastrectomy sleeve    TONSILLECTOMY         Social History     Socioeconomic History    Marital status: Single     Spouse name: Not on file    Number of children: Not on file    Years of education: Not on file    Highest education level: Not on file   Occupational History    Occupation:      Employer: [employer]    Financial resource strain: Not on file    Food insecurity     Worry: Not on file     Inability: Not on file    Transportation needs     Medical: Not on file     Non-medical: Not on file   Tobacco Use    Smoking status: Former Smoker     Packs/day: 0.50     Years: 1.00     Pack years: 0.50     Types: Cigarettes     Last attempt to quit: 2014     Years since quittin.4    Smokeless tobacco: Never Used   Substance and Sexual Activity    Alcohol use: Yes     Comment: social    Drug use: No    Sexual activity: Yes     Partners: Male   Lifestyle    Physical activity     Days per week: Not on file     Minutes per session: Not on file    Stress: Not on file   Relationships    Social connections     Talks on phone: Not on file     Gets together: Not on file     Attends Yarsani service: Not on file     Active member of club or organization: Not on file     Attends meetings of clubs or organizations: Not on file     Relationship status: Not on file    Intimate partner violence     Fear of current or ex partner: Not on file     Emotionally abused: Not on file     Physically abused: Not on file     Forced sexual activity: Not on file   Other Topics Concern    Not on file   Social History Narrative    ** Merged History Encounter **             Family History   Problem Relation Age of Onset    Bipolar Disorder Mother     Arthritis Mother     Depression Mother     High Blood Pressure Mother     High Cholesterol Mother     Mental Illness Mother     Stroke Mother     Clotting Disorder Father     Depression Sister     Diabetes Maternal Aunt     Cancer Maternal Grandmother         pheochromocytoma- 61       ADVANCE DIRECTIVE: N, <no information>    Vitals:    11/09/20 1301   BP: 110/60   Site: Left Upper Arm   Position: Sitting   Cuff Size: Medium Adult   Pulse: 67   Temp: 98.2 °F (36.8 °C)   SpO2: 100%   Weight: 165 lb (74.8 kg)     Estimated body mass index is 29.7 kg/m² as calculated from the following:    Height as of 9/5/20: 5' 2.5\" (1.588 m). Weight as of this encounter: 165 lb (74.8 kg). Physical Exam  Constitutional:       General: She is not in acute distress. Appearance: She is well-developed. She is not diaphoretic. HENT:      Head: Normocephalic and atraumatic. Right Ear: External ear normal.      Left Ear: External ear normal.      Nose: Nose normal.   Eyes:      Conjunctiva/sclera: Conjunctivae normal.      Pupils: Pupils are equal, round, and reactive to light. Neck:      Musculoskeletal: Full passive range of motion without pain and normal range of motion. Thyroid: No thyroid mass. Trachea: Trachea normal.   Cardiovascular:      Rate and Rhythm: Normal rate and regular rhythm. Heart sounds: Normal heart sounds, S1 normal and S2 normal. Heart sounds not distant. No friction rub. Pulmonary:      Effort: Pulmonary effort is normal. No accessory muscle usage or respiratory distress. Breath sounds: Normal breath sounds. Abdominal:      General: Bowel sounds are normal. There is no distension. Palpations: Abdomen is soft. There is no mass.       Comments: llq pain, pt states this has been present since bariatric surgery in 2014. Musculoskeletal: Normal range of motion. Comments: Pain free ROM     Lymphadenopathy:      Cervical: No cervical adenopathy. Skin:     General: Skin is warm and dry. Findings: No rash. Neurological:      Mental Status: She is oriented to person, place, and time. Comments: Gait is normal.   Psychiatric:         Behavior: Behavior normal.         Thought Content: Thought content normal.         Judgment: Judgment normal.         No flowsheet data found. Lab Results   Component Value Date    CHOL 189 09/12/2014    CHOL 193 02/24/2014    TRIG 51 09/12/2014    TRIG 86 02/24/2014    HDL 68 09/12/2014    HDL 56 02/24/2014    LDLCHOLESTEROL 111 09/12/2014    LDLCHOLESTEROL 120 02/24/2014    GLUCOSE 81 05/20/2016    LABA1C 5.2 11/01/2018    LABA1C 5.4 04/11/2018    LABA1C 5.3 09/12/2014       The ASCVD Risk score (Mya Langford, et al., 2013) failed to calculate for the following reasons: The 2013 ASCVD risk score is only valid for ages 36 to 78      There is no immunization history on file for this patient. Health Maintenance   Topic Date Due    Varicella vaccine (1 of 2 - 2-dose childhood series) 12/20/1995    HPV vaccine (1 - 2-dose series) 12/20/2005    Cervical cancer screen  08/09/2020    DTaP/Tdap/Td vaccine (1 - Tdap) 11/15/2020 (Originally 12/20/2013)    Flu vaccine (1) 11/09/2021 (Originally 9/1/2020)    HIV screen  Completed    Hepatitis A vaccine  Aged Out    Hepatitis B vaccine  Aged Out    Hib vaccine  Aged Out    Meningococcal (ACWY) vaccine  Aged Out    Pneumococcal 0-64 years Vaccine  Aged Out       ASSESSMENT/PLAN:  1. Vegan diet    - CBC; Future  - Comprehensive Metabolic Panel; Future  - Iron And TIBC; Future  - Vitamin D 25 Hydroxy; Future  - Vitamin B12 & Folate; Future    2. Screening for thyroid disorder    - TSH; Future    3. LLQ pain    - XR ABDOMEN (KUB) (SINGLE AP VIEW); Future    4.  History of depression    - [facility]    5. Routine general medical examination at a health care facility        No follow-ups on file. An electronic signature was used to authenticate this note.     --BENY Faust - CNP on 11/9/2020 at 1:37 PM

## 2020-11-10 ENCOUNTER — HOSPITAL ENCOUNTER (OUTPATIENT)
Facility: CLINIC | Age: 25
Discharge: HOME | End: 2020-11-12
Payer: COMMERCIAL

## 2020-11-10 ENCOUNTER — HOSPITAL ENCOUNTER (OUTPATIENT)
Age: 25
Setting detail: SPECIMEN
Discharge: HOME | End: 2020-11-10
Payer: COMMERCIAL

## 2020-11-10 ENCOUNTER — HOSPITAL ENCOUNTER (OUTPATIENT)
Dept: GENERAL RADIOLOGY | Facility: CLINIC | Age: 25
Discharge: HOME | End: 2020-11-12
Payer: COMMERCIAL

## 2020-11-10 LAB
ALBUMIN SERPL-MCNC: 4.5 G/DL (ref 3.5–5.2)
ALBUMIN/GLOBULIN RATIO: 2 (ref 1–2.5)
ALP BLD-CCNC: 48 U/L (ref 35–104)
ALT SERPL-CCNC: 38 U/L (ref 5–33)
ANION GAP SERPL CALCULATED.3IONS-SCNC: 14 MMOL/L (ref 9–17)
AST SERPL-CCNC: 40 U/L
BILIRUB SERPL-MCNC: 0.48 MG/DL (ref 0.3–1.2)
BUN BLDV-MCNC: 8 MG/DL (ref 6–20)
BUN/CREAT BLD: ABNORMAL (ref 9–20)
CALCIUM SERPL-MCNC: 8.9 MG/DL (ref 8.6–10.4)
CHLORIDE BLD-SCNC: 103 MMOL/L (ref 98–107)
CO2: 21 MMOL/L (ref 20–31)
CREAT SERPL-MCNC: 0.58 MG/DL (ref 0.5–0.9)
FOLATE: 10.5 NG/ML
GFR AFRICAN AMERICAN: >60 ML/MIN
GFR NON-AFRICAN AMERICAN: >60 ML/MIN
GFR SERPL CREATININE-BSD FRML MDRD: ABNORMAL ML/MIN/{1.73_M2}
GFR SERPL CREATININE-BSD FRML MDRD: ABNORMAL ML/MIN/{1.73_M2}
GLUCOSE BLD-MCNC: 77 MG/DL (ref 70–99)
HCT VFR BLD CALC: 33 % (ref 36.3–47.1)
HEMOGLOBIN: 9.3 G/DL (ref 11.9–15.1)
IRON SATURATION: 7 % (ref 20–55)
IRON: 31 UG/DL (ref 37–145)
MCH RBC QN AUTO: 23.1 PG (ref 25.2–33.5)
MCHC RBC AUTO-ENTMCNC: 28.2 G/DL (ref 28.4–34.8)
MCV RBC AUTO: 82.1 FL (ref 82.6–102.9)
NRBC AUTOMATED: 0 PER 100 WBC
PDW BLD-RTO: 14.8 % (ref 11.8–14.4)
PLATELET # BLD: 436 K/UL (ref 138–453)
PMV BLD AUTO: 10.9 FL (ref 8.1–13.5)
POTASSIUM SERPL-SCNC: 3.8 MMOL/L (ref 3.7–5.3)
RBC # BLD: 4.02 M/UL (ref 3.95–5.11)
SODIUM BLD-SCNC: 138 MMOL/L (ref 135–144)
TOTAL IRON BINDING CAPACITY: 471 UG/DL (ref 250–450)
TOTAL PROTEIN: 6.8 G/DL (ref 6.4–8.3)
TSH SERPL DL<=0.05 MIU/L-ACNC: 1.02 MIU/L (ref 0.3–5)
UNSATURATED IRON BINDING CAPACITY: 440 UG/DL (ref 112–347)
VITAMIN B-12: 276 PG/ML (ref 232–1245)
VITAMIN D 25-HYDROXY: 13 NG/ML (ref 30–100)
WBC # BLD: 4.2 K/UL (ref 3.5–11.3)

## 2020-11-10 PROCEDURE — 74018 RADEX ABDOMEN 1 VIEW: CPT

## 2020-11-17 ENCOUNTER — TELEMEDICINE (OUTPATIENT)
Dept: FAMILY MEDICINE CLINIC | Age: 25
End: 2020-11-17
Payer: COMMERCIAL

## 2020-11-17 PROCEDURE — 99214 OFFICE O/P EST MOD 30 MIN: CPT | Performed by: NURSE PRACTITIONER

## 2020-11-17 RX ORDER — MELATONIN
1000 DAILY
Qty: 90 TABLET | Refills: 1 | Status: SHIPPED | OUTPATIENT
Start: 2020-11-17 | End: 2021-06-10

## 2020-11-17 NOTE — PROGRESS NOTES
VISIT LOCATION: Home    TELEHEALTH EVALUATION -- Audio/Visual (During NKNPF-23 public health emergency)    Due to COVID 23 outbreak, patient's office visit was converted to a virtual visit. Patient was contacted and agreed to proceed with a virtual visit via [address] Visit  The risks and benefits of converting to a virtual visit were discussed in light of the current infectious disease epidemic. Patient also understood that insurance coverage and co-pays are up to their individual insurance plans. Tarsha Lopez (:  1995) has requested an audio/video evaluation for the following concern(s):    Chief Complaint:   HPI:    Ayleen Cobos- used to have really periods. they are not as bad now. They last 5 days. Not using abnormal amt of pads or tampons. This looks like it has been a long time problem. Pt thinks she has had an iron infusion in the past.  She does not know if she ever had oral iron. She does have delta storage pool disease. Will give referral to hem/oc. Low vitamin d    Review of Systems   Constitutional: Negative for chills and fever. Respiratory: Negative for cough and shortness of breath. Cardiovascular: Negative for chest pain, palpitations and leg swelling. Prior to Visit Medications    Medication Sig Taking? Authorizing Provider   vitamin D3 (CHOLECALCIFEROL) 25 MCG (1000 UT) TABS tablet Take 1 tablet by mouth daily Yes BENY Whalen - CNP   ibuprofen (ADVIL;MOTRIN) 600 MG tablet Take 1 tablet by mouth every 6 hours as needed for Pain  Patient not taking: Reported on 2020  Gino Lennox, MD     Social- none    Past Medical History:   Diagnosis Date    Delta storage pool disease [facility]) 2011    1.36 DG/PL/PT STATES RECEIVING PLATELETS PREOP PER DR. Maximo Ramirez    Depression     Hematuria     History of chest pain     Hyperinsulinism     Hypertension     Insulin resistance     Leg lesion     Microcytic anemia     Obesity     Pain     RIGHT KNEE/HX DISLOCATION SEVERAL TIMES    S/P laparoscopic sleeve gastrectomy 12-22-14    start wt = 237lb, Dr. Alanna Lane   ,   Past Surgical History:   Procedure Laterality Date    BARIATRIC SURGERY      OTHER SURGICAL HISTORY  12/22/14    laparascopic Robotic Gastrectomy sleeve    TONSILLECTOMY               [] OTHER:    Constitutional: [x] Appears well-developed and well-nourished [] No apparent distress                            [] Abnormal-   Mental status  [x] Alert and awake  [x] Oriented to person/place/time [x]Able to follow commands       Eyes:  EOM    [x]  Normal  [] Abnormal-  Sclera  [x]  Normal  [] Abnormal -         Discharge [x]  None visible  [] Abnormal -     HENT:   [x] Normocephalic, atraumatic. [] Abnormal   [] Mouth/Throat: Mucous membranes are moist.      External Ears [x] Normal  [] Abnormal-      Neck: [x] No visualized mass      Pulmonary/Chest: [x] Respiratory effort normal.  [] No visualized signs of difficulty breathing or respiratory distress        [] Abnormal-      Musculoskeletal:   [] Normal gait with no signs of ataxia         [x] Normal range of motion of neck        [] Abnormal-   [] Motor grossly intact in visible upper extremities    [] Motor grossly intact in visible lower extremities        Neurological:        [x] No Facial Asymmetry (Cranial nerve 7 motor function) (limited exam to video visit)                       [x] No gaze palsy        [] Abnormal-         Skin:                     [x] No significant exanthematous lesions or discoloration noted on facial skin         [] Abnormal-                                  Psychiatric:           [x] Normal Affect [] No Hallucinations        [] Abnormal- [] Normal Mood  [] Anxious appearing    [] Depressed appearing  [] Confused appearing      Due to this being a TeleHealth encounter, evaluation of the following organ systems is limited: Vitals/Constitutional/EENT/Resp/CV/GI//MS/Neuro/Skin/Heme-Lymph-Imm. ASSESSMENT/PLAN:  Encounter Diagnoses   Name Primary?  Iron deficiency anemia, unspecified iron deficiency anemia type Yes    Delta storage pool disease     Low vitamin D level        Orders Placed This Encounter   Medications    vitamin D3 (CHOLECALCIFEROL) 25 MCG (1000 UT) TABS tablet     Sig: Take 1 tablet by mouth daily     Dispense:  90 tablet     Refill:  1         No follow-ups on file. The time that was spent with the family/patient addressing care on this video call and chart review was 25 minutes. An  electronic signature was used to authenticate this note. --BENY Alvarado CNP on 11/18/2020 at 8:09 AM        Pursuant to the emergency declaration under the [facility] waiver authority and the [employer] and [employer] Act, this Virtual  Visit was conducted, with patient's consent, to reduce the patient's risk of exposure to COVID-19 and provide continuity of care for an established patient. Services were provided through a telephone discussion virtually to substitute for in-person clinic visit.

## 2020-11-18 ASSESSMENT — ENCOUNTER SYMPTOMS
SHORTNESS OF BREATH: 0
COUGH: 0

## 2020-11-19 ENCOUNTER — HOSPITAL ENCOUNTER (OUTPATIENT)
Facility: MEDICAL CENTER | Age: 25
End: 2020-11-19
Payer: COMMERCIAL

## 2020-11-30 ENCOUNTER — TELEPHONE (OUTPATIENT)
Dept: ONCOLOGY | Age: 25
End: 2020-11-30

## 2020-11-30 ENCOUNTER — INITIAL CONSULT (OUTPATIENT)
Dept: ONCOLOGY | Age: 25
End: 2020-11-30
Payer: COMMERCIAL

## 2020-11-30 VITALS
WEIGHT: 167 LBS | HEART RATE: 65 BPM | TEMPERATURE: 98.5 F | DIASTOLIC BLOOD PRESSURE: 68 MMHG | BODY MASS INDEX: 29.59 KG/M2 | SYSTOLIC BLOOD PRESSURE: 125 MMHG | HEIGHT: 63 IN

## 2020-11-30 PROCEDURE — 99244 OFF/OP CNSLTJ NEW/EST MOD 40: CPT | Performed by: INTERNAL MEDICINE

## 2020-11-30 RX ORDER — FERROUS GLUCONATE 324(37.5)
324 TABLET ORAL 2 TIMES DAILY
Qty: 60 TABLET | Refills: 3 | Status: SHIPPED | OUTPATIENT
Start: 2020-11-30 | End: 2021-06-10

## 2020-11-30 RX ORDER — CHOLECALCIFEROL (VITAMIN D3) 125 MCG
500 CAPSULE ORAL DAILY
Qty: 30 TABLET | Refills: 3 | Status: SHIPPED | OUTPATIENT
Start: 2020-11-30 | End: 2022-06-20

## 2020-11-30 NOTE — PATIENT INSTRUCTIONS
Return in 4 months, need labs include CBC, iron studies, G77 and folic acid, LDH, and platelet function test, von Willebrand testing prior to return visit, see orders

## 2020-11-30 NOTE — PROGRESS NOTES
DIAGNOSIS:   1. Anemia  2. Menorrhagia  3. Low B12   4. possible bleeding disorder      CURRENT THERAPY:  Plan for oral iron and B12  Plan for work up to rule out bleeding disorder    BRIEF CASE HISTORY:   Kristopher James is a very pleasant 21 y.o. female who is referred to us for anemia. She reports she has history of low iron. She had hematuria as a ped and diagnosed with bleeding disorder. She has not had any hematuria or hematochezia as an adult. She denies any epistaxis or bleeding with brushing. She does feel she clots slowly with cuts. She has not been hospitalized with bleeding or received transfusion. She has history of menorrhagia with clotting, her cycles are every 25 days, she is not currently on birth control or using IUD, no uterine ablation, not currently trying to get pregnant. She follows vegan diet, not currently taking B12. PAST MEDICAL HISTORY: has a past medical history of Delta storage pool disease [facility]), Depression, Hematuria, History of chest pain, Hyperinsulinism, Hypertension, Insulin resistance, Leg lesion, Microcytic anemia, Obesity, Pain, and S/P laparoscopic sleeve gastrectomy. PAST SURGICAL HISTORY: has a past surgical history that includes Tonsillectomy; other surgical history (12/22/14); and Bariatric Surgery. CURRENT MEDICATIONS:  has a current medication list which includes the following prescription(s): vitamin d3 and ibuprofen. ALLERGIES:  is allergic to pcn [penicillins]. FAMILY HISTORY: Negative for any hematological or oncological conditions. SOCIAL HISTORY:  reports that she quit smoking about 6 years ago. Her smoking use included cigarettes. She has a 0.50 pack-year smoking history. She has never used smokeless tobacco. She reports current alcohol use. She reports that she does not use drugs. REVIEW OF SYSTEMS:   General: No fever or night sweats. Weight is stable.   ENT: No double or blurred vision, no tinnitus or hearing problem, no dysphagia or sore throat   Respiratory: No chest pain, no shortness of breath, no cough or hemoptysis. Cardiovascular: Denies chest pain, PND or orthopnea. No L E swelling or palpitations. Gastrointestinal: No nausea or vomiting, abdominal pain, diarrhea or constipation. Genitourinary: Denies dysuria, hematuria, frequency, urgency or incontinence. Neurological: Denies headaches, decreased LOC, no sensory or motor focal deficits. Musculoskeletal: No arthralgia no back pain or joint swelling. Skin: There are no rashes or bleeding. Psychiatric: No anxiety, no depression. Endocrine: No diabetes or thyroid disease. Hematologic: No bleeding, no adenopathy. PHYSICAL EXAM: Shows a well appearing 21y.o.-year-old female who is not in pain or distress. Vital Signs: Blood pressure 125/68, pulse 65, temperature 98.5 °F (36.9 °C), temperature source Oral, height 5' 2.5\" (1.588 m), weight 167 lb (75.8 kg), not currently breastfeeding. HEENT: Normocephalic and atraumatic. Pupils are equal, round, reactive to light and accommodation. Extraocular muscles are intact. Neck: Showed no JVD, no carotid bruit . Lungs: Clear to auscultation bilaterally. Heart: Regular without any murmur. Abdomen: Soft, nontender. No hepatosplenomegaly. Extremities: Lower extremities show no edema, clubbing, or cyanosis. Breasts: Examination not done today.  Neuro exam: intact cranial nerves bilaterally no motor or sensory deficit, gait is normal. Lymphatic: no adenopathy appreciated in the supraclavicular, axillary, cervical or inguinal area    REVIEW OF LABORATORY DATA:   Lab Results   Component Value Date    WBC 4.2 11/10/2020    HGB 9.3 (L) 11/10/2020    HCT 33.0 (L) 11/10/2020    MCV 82.1 (L) 11/10/2020     11/10/2020       Chemistry        Component Value Date/Time     11/10/2020 0810    K 3.8 11/10/2020 0810     11/10/2020 0810    CO2 21 11/10/2020 0810    BUN 8 11/10/2020 0810    CREATININE 0.58 11/10/2020 0810 Component Value Date/Time    CALCIUM 8.9 11/10/2020 0810    ALKPHOS 48 11/10/2020 0810    AST 40 (H) 11/10/2020 0810    ALT 38 (H) 11/10/2020 0810    BILITOT 0.48 11/10/2020 0810        Lab Results   Component Value Date    IRON 31 (L) 11/10/2020    TIBC 471 (H) 11/10/2020    FERRITIN 6 (L) 11/02/2013         REVIEW OF RADIOLOGICAL RESULTS:     IMPRESSION:   1. Iron deficiency anemia  2. Menorrhagia  3. Possible bleeding disorder we will confirm with testing  4. Low B12  5. Plan for oral supplementation and lab work    PLAN:   1. We discussed in detail her hematological history. 2. I reviewed her lab work showing anemia with low iron as well as low B12.   3. I discussed plan for oral iron to be taken with vitamin C and B12 with plan for repeat lab work to assess response. 4. We will also plan to test for any bleeding tendency. 5. Return in 4 months to discuss results and recommendations.

## 2020-11-30 NOTE — TELEPHONE ENCOUNTER
[facility] ARRIVES AMBULATORY FOR CONSULTATION APPOINTMENT  DR Rashmi Childs IN TO SEE PATIENT  ORDERS RECEIVED  RV 4 MONTHS W /LABS INCLUDING CDP FE STUDIES T72 & FOLIC ACID LDH PLATELET FUNCTION TEST VON WILEBRANDT TEST  LABS CDP FE TIBC FERRITIN VITAMIN B12 & FOLATE RETIC LD PLATELET FUNCTION TEST FIBRINOGEN VON WILLEBRAND ANTIGEN FACTOR 8 RISTOCETIN COFACTOR  3/19/21  MD VISIT 3/26/21 @3PM  SCRIPTS SENT TO PATIENTS PHARMACY   AVS PRINTED AND GIVEN TO PATIENT WITH INSTRUCTIONS  PATIENT DISCHARGED AMBULATORY

## 2020-11-30 NOTE — LETTER
Chaparrita Charles MD    11/30/2020     Anjana Dears, [address]   [address] [place] [address] [address] [facility] [address]    Dear Chance Regalado : Thank you for referring Dipti Miguel, 1995, to me for evaluation. Below are the relevant portions of my assessment and plan of care. DIAGNOSIS:   1. Anemia  2. Menorrhagia  3. Low B12   4. possible bleeding disorder      CURRENT THERAPY:  Plan for oral iron and B12  Plan for work up to rule out bleeding disorder    BRIEF CASE HISTORY:   Dipti Miguel is a very pleasant 21 y.o. female who is referred to us for anemia. She reports she has history of low iron. She had hematuria as a ped and diagnosed with bleeding disorder. She has not had any hematuria or hematochezia as an adult. She denies any epistaxis or bleeding with brushing. She does feel she clots slowly with cuts. She has not been hospitalized with bleeding or received transfusion. She has history of menorrhagia with clotting, her cycles are every 25 days, she is not currently on birth control or using IUD, no uterine ablation, not currently trying to get pregnant. She follows vegan diet, not currently taking B12. PAST MEDICAL HISTORY: has a past medical history of Delta storage pool disease [facility]), Depression, Hematuria, History of chest pain, Hyperinsulinism, Hypertension, Insulin resistance, Leg lesion, Microcytic anemia, Obesity, Pain, and S/P laparoscopic sleeve gastrectomy. PAST SURGICAL HISTORY: has a past surgical history that includes Tonsillectomy; other surgical history (12/22/14); and Bariatric Surgery. CURRENT MEDICATIONS:  has a current medication list which includes the following prescription(s): vitamin d3 and ibuprofen. ALLERGIES:  is allergic to pcn [penicillins]. FAMILY HISTORY: Negative for any hematological or oncological conditions. SOCIAL HISTORY:  reports that she quit smoking about 6 years ago.  Her smoking use included cigarettes. She has a 0.50 pack-year smoking history. She has never used smokeless tobacco. She reports current alcohol use. She reports that she does not use drugs. REVIEW OF SYSTEMS:   General: No fever or night sweats. Weight is stable. ENT: No double or blurred vision, no tinnitus or hearing problem, no dysphagia or sore throat   Respiratory: No chest pain, no shortness of breath, no cough or hemoptysis. Cardiovascular: Denies chest pain, PND or orthopnea. No L E swelling or palpitations. Gastrointestinal: No nausea or vomiting, abdominal pain, diarrhea or constipation. Genitourinary: Denies dysuria, hematuria, frequency, urgency or incontinence. Neurological: Denies headaches, decreased LOC, no sensory or motor focal deficits. Musculoskeletal: No arthralgia no back pain or joint swelling. Skin: There are no rashes or bleeding. Psychiatric: No anxiety, no depression. Endocrine: No diabetes or thyroid disease. Hematologic: No bleeding, no adenopathy. PHYSICAL EXAM: Shows a well appearing 21y.o.-year-old female who is not in pain or distress. Vital Signs: Blood pressure 125/68, pulse 65, temperature 98.5 °F (36.9 °C), temperature source Oral, height 5' 2.5\" (1.588 m), weight 167 lb (75.8 kg), not currently breastfeeding. HEENT: Normocephalic and atraumatic. Pupils are equal, round, reactive to light and accommodation. Extraocular muscles are intact. Neck: Showed no JVD, no carotid bruit . Lungs: Clear to auscultation bilaterally. Heart: Regular without any murmur. Abdomen: Soft, nontender. No hepatosplenomegaly. Extremities: Lower extremities show no edema, clubbing, or cyanosis. Breasts: Examination not done today.  Neuro exam: intact cranial nerves bilaterally no motor or sensory deficit, gait is normal. Lymphatic: no adenopathy appreciated in the supraclavicular, axillary, cervical or inguinal area    REVIEW OF LABORATORY DATA:   Lab Results   Component Value Date WBC 4.2 11/10/2020    HGB 9.3 (L) 11/10/2020    HCT 33.0 (L) 11/10/2020    MCV 82.1 (L) 11/10/2020     11/10/2020       Chemistry        Component Value Date/Time     11/10/2020 0810    K 3.8 11/10/2020 0810     11/10/2020 0810    CO2 21 11/10/2020 0810    BUN 8 11/10/2020 0810    CREATININE 0.58 11/10/2020 0810        Component Value Date/Time    CALCIUM 8.9 11/10/2020 0810    ALKPHOS 48 11/10/2020 0810    AST 40 (H) 11/10/2020 0810    ALT 38 (H) 11/10/2020 0810    BILITOT 0.48 11/10/2020 0810        Lab Results   Component Value Date    IRON 31 (L) 11/10/2020    TIBC 471 (H) 11/10/2020    FERRITIN 6 (L) 11/02/2013         REVIEW OF RADIOLOGICAL RESULTS:     IMPRESSION:   1. Iron deficiency anemia  2. Menorrhagia  3. Possible bleeding disorder we will confirm with testing  4. Low B12  5. Plan for oral supplementation and lab work    PLAN:   1. We discussed in detail her hematological history. 2. I reviewed her lab work showing anemia with low iron as well as low B12.   3. I discussed plan for oral iron to be taken with vitamin C and B12 with plan for repeat lab work to assess response. 4. We will also plan to test for any bleeding tendency. 5. Return in 4 months to discuss results and recommendations. If you have questions, please do not hesitate to call me. I look forward to following Jovanni Badillo along with you.     Sincerely,    Karlos Falk MD  Hematology/ Oncology  Cell [phone]

## 2020-12-02 ENCOUNTER — TELEPHONE (OUTPATIENT)
Dept: BARIATRICS/WEIGHT MGMT | Age: 25
End: 2020-12-02

## 2021-03-16 ENCOUNTER — HOSPITAL ENCOUNTER (OUTPATIENT)
Dept: GENERAL RADIOLOGY | Age: 26
Discharge: HOME | End: 2021-03-18
Payer: COMMERCIAL

## 2021-03-16 ENCOUNTER — HOSPITAL ENCOUNTER (OUTPATIENT)
Age: 26
Discharge: HOME | End: 2021-03-18
Payer: COMMERCIAL

## 2021-03-16 DIAGNOSIS — M47.816 LUMBAR SPONDYLOSIS: ICD-10-CM

## 2021-03-16 PROCEDURE — 72170 X-RAY EXAM OF PELVIS: CPT

## 2021-03-16 PROCEDURE — 72100 X-RAY EXAM L-S SPINE 2/3 VWS: CPT

## 2021-03-19 ENCOUNTER — HOSPITAL ENCOUNTER (OUTPATIENT)
Facility: MEDICAL CENTER | Age: 26
Discharge: HOME | End: 2021-03-19
Payer: COMMERCIAL

## 2021-03-19 DIAGNOSIS — D50.9 IRON DEFICIENCY ANEMIA, UNSPECIFIED IRON DEFICIENCY ANEMIA TYPE: ICD-10-CM

## 2021-03-19 DIAGNOSIS — D69.1 DELTA STORAGE POOL DISEASE (HCC): Chronic | ICD-10-CM

## 2021-03-19 LAB
ABSOLUTE EOS #: 0.05 K/UL (ref 0–0.44)
ABSOLUTE IMMATURE GRANULOCYTE: 0 K/UL (ref 0–0.3)
ABSOLUTE LYMPH #: 1.78 K/UL (ref 1.1–3.7)
ABSOLUTE MONO #: 0.21 K/UL (ref 0.1–1.2)
ABSOLUTE RETIC #: 0.03 M/UL (ref 0.03–0.08)
BASOPHILS # BLD: 1 % (ref 0–2)
BASOPHILS ABSOLUTE: 0.03 K/UL (ref 0–0.2)
COLLAGEN ADENOSINE-5'-DIPHOSPHATE (ADP) TIME: 71 SEC (ref 67–112)
COLLAGEN EPINEPHRINE TIME: 93 SEC (ref 85–172)
DIFFERENTIAL TYPE: ABNORMAL
EOSINOPHILS RELATIVE PERCENT: 1 % (ref 1–4)
FERRITIN: 14 UG/L (ref 13–150)
FIBRINOGEN: 313 MG/DL (ref 179–518)
FOLATE: 11.3 NG/ML
HCT VFR BLD CALC: 33.8 % (ref 36.3–47.1)
HEMOGLOBIN: 10.1 G/DL (ref 11.9–15.1)
IMMATURE GRANULOCYTES: 0 %
IMMATURE RETIC FRACT: 14.7 % (ref 2.7–18.3)
IRON SATURATION: 6 % (ref 20–55)
IRON: 30 UG/DL (ref 37–145)
LACTATE DEHYDROGENASE: 200 U/L (ref 135–214)
LYMPHOCYTES # BLD: 35 % (ref 24–43)
MCH RBC QN AUTO: 23.9 PG (ref 25.2–33.5)
MCHC RBC AUTO-ENTMCNC: 29.9 G/DL (ref 28.4–34.8)
MCV RBC AUTO: 79.9 FL (ref 82.6–102.9)
MONOCYTES # BLD: 4 % (ref 3–12)
NRBC AUTOMATED: 0 PER 100 WBC
PDW BLD-RTO: 14.4 % (ref 11.8–14.4)
PLATELET # BLD: 321 K/UL (ref 138–453)
PLATELET ESTIMATE: ABNORMAL
PLATELET FUNCTION INTERP: NORMAL
PMV BLD AUTO: 10 FL (ref 8.1–13.5)
RBC # BLD: 4.23 M/UL (ref 3.95–5.11)
RBC # BLD: ABNORMAL 10*6/UL
RETIC %: 0.8 % (ref 0.5–1.9)
RETIC HEMOGLOBIN: 20.2 PG (ref 28.2–35.7)
SEG NEUTROPHILS: 59 % (ref 36–65)
SEGMENTED NEUTROPHILS ABSOLUTE COUNT: 3 K/UL (ref 1.5–8.1)
TOTAL IRON BINDING CAPACITY: 504 UG/DL (ref 250–450)
UNSATURATED IRON BINDING CAPACITY: 474 UG/DL (ref 112–347)
VITAMIN B-12: 428 PG/ML (ref 232–1245)
WBC # BLD: 5.1 K/UL (ref 3.5–11.3)
WBC # BLD: ABNORMAL 10*3/UL

## 2021-03-19 PROCEDURE — 83540 ASSAY OF IRON: CPT

## 2021-03-19 PROCEDURE — 83550 IRON BINDING TEST: CPT

## 2021-03-19 PROCEDURE — 85384 FIBRINOGEN ACTIVITY: CPT

## 2021-03-19 PROCEDURE — 85045 AUTOMATED RETICULOCYTE COUNT: CPT

## 2021-03-19 PROCEDURE — 85246 CLOT FACTOR VIII VW ANTIGEN: CPT

## 2021-03-19 PROCEDURE — 85245 CLOT FACTOR VIII VW RISTOCTN: CPT

## 2021-03-19 PROCEDURE — 36415 COLL VENOUS BLD VENIPUNCTURE: CPT

## 2021-03-19 PROCEDURE — 82728 ASSAY OF FERRITIN: CPT

## 2021-03-19 PROCEDURE — 85025 COMPLETE CBC W/AUTO DIFF WBC: CPT

## 2021-03-19 PROCEDURE — 82607 VITAMIN B-12: CPT

## 2021-03-19 PROCEDURE — 85576 BLOOD PLATELET AGGREGATION: CPT

## 2021-03-19 PROCEDURE — 83615 LACTATE (LD) (LDH) ENZYME: CPT

## 2021-03-19 PROCEDURE — 82746 ASSAY OF FOLIC ACID SERUM: CPT

## 2021-03-22 ENCOUNTER — HOSPITAL ENCOUNTER (OUTPATIENT)
Facility: MEDICAL CENTER | Age: 26
End: 2021-03-22
Payer: COMMERCIAL

## 2021-03-22 LAB
RISTOCETIN CO-FACTOR: 156 % (ref 51–215)
VON WILLEBRAND AG: 234 % (ref 52–214)

## 2021-03-26 ENCOUNTER — TELEPHONE (OUTPATIENT)
Dept: ONCOLOGY | Age: 26
End: 2021-03-26

## 2021-03-26 ENCOUNTER — OFFICE VISIT (OUTPATIENT)
Dept: ONCOLOGY | Age: 26
End: 2021-03-26
Payer: COMMERCIAL

## 2021-03-26 ENCOUNTER — HOSPITAL ENCOUNTER (OUTPATIENT)
Facility: MEDICAL CENTER | Age: 26
End: 2021-03-26
Payer: COMMERCIAL

## 2021-03-26 VITALS
SYSTOLIC BLOOD PRESSURE: 116 MMHG | TEMPERATURE: 98.2 F | HEART RATE: 85 BPM | DIASTOLIC BLOOD PRESSURE: 70 MMHG | RESPIRATION RATE: 16 BRPM | WEIGHT: 160.1 LBS | BODY MASS INDEX: 28.82 KG/M2

## 2021-03-26 DIAGNOSIS — D50.9 IRON DEFICIENCY ANEMIA, UNSPECIFIED IRON DEFICIENCY ANEMIA TYPE: ICD-10-CM

## 2021-03-26 DIAGNOSIS — K90.9 IRON MALABSORPTION: ICD-10-CM

## 2021-03-26 DIAGNOSIS — D69.1 DELTA STORAGE POOL DISEASE (HCC): Primary | ICD-10-CM

## 2021-03-26 PROCEDURE — 99214 OFFICE O/P EST MOD 30 MIN: CPT | Performed by: INTERNAL MEDICINE

## 2021-03-26 PROCEDURE — 99211 OFF/OP EST MAY X REQ PHY/QHP: CPT | Performed by: INTERNAL MEDICINE

## 2021-03-26 RX ORDER — SODIUM CHLORIDE 0.9 % (FLUSH) 0.9 %
5 SYRINGE (ML) INJECTION PRN
Status: CANCELLED | OUTPATIENT
Start: 2021-04-16

## 2021-03-26 RX ORDER — METHYLPREDNISOLONE SODIUM SUCCINATE 125 MG/2ML
125 INJECTION, POWDER, LYOPHILIZED, FOR SOLUTION INTRAMUSCULAR; INTRAVENOUS ONCE
Status: CANCELLED | OUTPATIENT
Start: 2021-04-16 | End: 2021-04-09

## 2021-03-26 RX ORDER — SODIUM CHLORIDE 0.9 % (FLUSH) 0.9 %
10 SYRINGE (ML) INJECTION PRN
Status: CANCELLED | OUTPATIENT
Start: 2021-04-16

## 2021-03-26 RX ORDER — DIPHENHYDRAMINE HYDROCHLORIDE 50 MG/ML
50 INJECTION INTRAMUSCULAR; INTRAVENOUS ONCE
Status: CANCELLED | OUTPATIENT
Start: 2021-04-16 | End: 2021-04-09

## 2021-03-26 RX ORDER — SODIUM CHLORIDE 9 MG/ML
INJECTION, SOLUTION INTRAVENOUS CONTINUOUS
Status: CANCELLED | OUTPATIENT
Start: 2021-04-16

## 2021-03-26 RX ORDER — HEPARIN SODIUM (PORCINE) LOCK FLUSH IV SOLN 100 UNIT/ML 100 UNIT/ML
500 SOLUTION INTRAVENOUS PRN
Status: CANCELLED | OUTPATIENT
Start: 2021-04-16

## 2021-03-26 RX ORDER — EPINEPHRINE 1 MG/ML
0.3 INJECTION, SOLUTION, CONCENTRATE INTRAVENOUS PRN
Status: CANCELLED | OUTPATIENT
Start: 2021-04-16

## 2021-03-26 NOTE — PATIENT INSTRUCTIONS
GYN referral   See orders for IV iron, to start once approved  rv in 3 months need cbc and iron studies prior to Rv

## 2021-03-26 NOTE — PROGRESS NOTES
DIAGNOSIS:   1. Anemia  2. Menorrhagia  3. Low B12   4. possible bleeding disorder, but work-up essentially is negative. CURRENT THERAPY:  Plan for oral iron and B12  Work-up is negative for bleeding  Disorder  Did not respond   to oral iron, plan IV iron    BRIEF CASE HISTORY:   Manjeet Krishna is a very pleasant 31 y.o. female who is referred to us for anemia. She reports she has history of low iron. She had hematuria as a ped and diagnosed with bleeding disorder. She has not had any hematuria or hematochezia as an adult. She denies any epistaxis or bleeding with brushing. She does feel she clots slowly with cuts. She has not been hospitalized with bleeding or received transfusion. She has history of menorrhagia with clotting, her cycles are every 25 days, she is not currently on birth control or using IUD, no uterine ablation, not currently trying to get pregnant. She follows vegan diet, was not taking B12. The patient was started on oral B12 and iron. She underwent a work-up for bleeding disorder, von Willebrand disease was excluded, platelet function was normal.  It was thought that her menorrhagia is due to local causes or hormone issues rather than bleeding dysfunction   INTERIM HISTORY  The patient comes in today for a follow-up, she has been taking oral iron for 4 months, CBC and iron studies showed no improvement in fact her iron saturation went from 7% down to 6%. 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 PAST SURGICAL HISTORY: has a past surgical history that includes Tonsillectomy; other surgical history (12/22/14); and Bariatric Surgery.      CURRENT MEDICATIONS:  has a current medication list which includes the following prescription(s): vitamin b-12, ferrous gluconate, and vitamin d3. ALLERGIES:  is allergic to pcn [penicillins]. FAMILY HISTORY: Negative for any hematological or oncological conditions. SOCIAL HISTORY:  reports that she quit smoking about 6 years ago. Her smoking use included cigarettes. She has a 0.50 pack-year smoking history. She has never used smokeless tobacco. She reports current alcohol use. She reports that she does not use drugs. REVIEW OF SYSTEMS:   General: No fever or night sweats. Weight is stable. ENT: No double or blurred vision, no tinnitus or hearing problem, no dysphagia or sore throat   Respiratory: No chest pain, no shortness of breath, no cough or hemoptysis. Cardiovascular: Denies chest pain, PND or orthopnea. No L E swelling or palpitations. Gastrointestinal: No nausea or vomiting, abdominal pain, diarrhea or constipation. Genitourinary: Denies dysuria, hematuria, frequency, urgency or incontinence. Neurological: Denies headaches, decreased LOC, no sensory or motor focal deficits. Musculoskeletal: No arthralgia no back pain or joint swelling. Skin: There are no rashes or bleeding. Psychiatric: No anxiety, no depression. Endocrine: No diabetes or thyroid disease. Hematologic: No bleeding, no adenopathy. PHYSICAL EXAM: Shows a well appearing 31y.o.-year-old female who is not in pain or distress. Vital Signs: Blood pressure 116/70, pulse 85, temperature 98.2 °F (36.8 °C), temperature source Temporal, resp. rate 16, weight 160 lb 1.6 oz (72.6 kg), not currently breastfeeding. HEENT: Normocephalic and atraumatic. Pupils are equal, round, reactive to light and accommodation. Extraocular muscles are intact. Neck: Showed no JVD, no carotid bruit . Lungs: Clear to auscultation bilaterally. Heart: Regular without any murmur. Abdomen: Soft, nontender. No hepatosplenomegaly. Extremities: Lower extremities show no edema, clubbing, or cyanosis.  Breasts: Examination not done today. Neuro exam: intact cranial nerves bilaterally no motor or sensory deficit, gait is normal. Lymphatic: no adenopathy appreciated in the supraclavicular, axillary, cervical or inguinal area    REVIEW OF LABORATORY DATA:   Lab Results   Component Value Date    WBC 5.1 03/19/2021    HGB 10.1 (L) 03/19/2021    HCT 33.8 (L) 03/19/2021    MCV 79.9 (L) 03/19/2021     03/19/2021       Chemistry        Component Value Date/Time     11/10/2020 0810    K 3.8 11/10/2020 0810     11/10/2020 0810    CO2 21 11/10/2020 0810    BUN 8 11/10/2020 0810    CREATININE 0.58 11/10/2020 0810        Component Value Date/Time    CALCIUM 8.9 11/10/2020 0810    ALKPHOS 48 11/10/2020 0810    AST 40 (H) 11/10/2020 0810    ALT 38 (H) 11/10/2020 0810    BILITOT 0.48 11/10/2020 0810        Lab Results   Component Value Date    IRON 30 (L) 03/19/2021    TIBC 504 (H) 03/19/2021    FERRITIN 14 03/19/2021         REVIEW OF RADIOLOGICAL RESULTS:     IMPRESSION:   1. Iron deficiency anemia  2. Menorrhagia  3. Low B12  4. No response to oral iron, plan IV iron    PLAN:   1. We discussed in detail her hematological history. 2. The work-up for bleeding tendency has been complete and it is essentially negative, the only thing that is not completely excluded with this is delta storage granules deficiency but her platelet function is normal so I do not think she has a significant platelet dysfunction  3. At this point, I would like to refer her to GYN to address her menorrhagia  4. She has severe iron deficiency anemia and no response to oral iron, I suspect a component of iron malabsorption, we will offer IV iron  5.  I plan to see her in 3 to 4 months

## 2021-03-26 NOTE — TELEPHONE ENCOUNTER
Edgar Mccarthy MD VISIT  SEE ORDERS FOR IV IRON TO START  ONCE APPROVED  GYN REFERRAL  RV IN 3 MTHS NEED CBC/IRON STUDIES PRIOR TO RV  NEW ORDERS IS PENDING PRECERT W/ ELLE  LABS CDP FE  TIBC FERRITIN ORDERS GIVEN TO PT ON EXIT  GYN REFERRAL WILL BE CALLED ON 3/29/21 DUE TO OFFICE BEING CLOSED  CALLED OB/GYN THEY SCHEDULED HER APPT FOR 4/12/21 @ 11:30AM  MD VISIT 6/18/21 @ 1:45PM  AVS PRINTED W/ INSTRUCTIONS

## 2021-03-31 ENCOUNTER — TELEPHONE (OUTPATIENT)
Dept: INFUSION THERAPY | Facility: MEDICAL CENTER | Age: 26
End: 2021-03-31

## 2021-04-03 ENCOUNTER — HOSPITAL ENCOUNTER (OUTPATIENT)
Dept: MRI IMAGING | Age: 26
Discharge: HOME | End: 2021-04-05
Payer: COMMERCIAL

## 2021-04-03 DIAGNOSIS — M53.3 SACROILIAC JOINT DYSFUNCTION: ICD-10-CM

## 2021-04-03 PROCEDURE — 72148 MRI LUMBAR SPINE W/O DYE: CPT

## 2021-04-09 RX ORDER — DOXYCYCLINE HYCLATE 50 MG/1
CAPSULE, GELATIN COATED ORAL
COMMUNITY
Start: 2021-01-08 | End: 2021-06-10

## 2021-04-12 ENCOUNTER — OFFICE VISIT (OUTPATIENT)
Dept: OBGYN CLINIC | Age: 26
End: 2021-04-12
Payer: COMMERCIAL

## 2021-04-12 ENCOUNTER — HOSPITAL ENCOUNTER (OUTPATIENT)
Age: 26
Discharge: HOME | End: 2021-04-12
Payer: COMMERCIAL

## 2021-04-12 VITALS
HEIGHT: 63 IN | WEIGHT: 164.4 LBS | HEART RATE: 74 BPM | TEMPERATURE: 98.1 F | SYSTOLIC BLOOD PRESSURE: 120 MMHG | BODY MASS INDEX: 29.13 KG/M2 | DIASTOLIC BLOOD PRESSURE: 60 MMHG

## 2021-04-12 DIAGNOSIS — Z01.419 VISIT FOR GYNECOLOGIC EXAMINATION: Primary | ICD-10-CM

## 2021-04-12 DIAGNOSIS — N92.1 MENORRHAGIA WITH IRREGULAR CYCLE: ICD-10-CM

## 2021-04-12 LAB
ABSOLUTE EOS #: 0.06 K/UL (ref 0–0.44)
ABSOLUTE IMMATURE GRANULOCYTE: <0.03 K/UL (ref 0–0.3)
ABSOLUTE LYMPH #: 2.08 K/UL (ref 1.1–3.7)
ABSOLUTE MONO #: 0.29 K/UL (ref 0.1–1.2)
BASOPHILS # BLD: 1 % (ref 0–2)
BASOPHILS ABSOLUTE: 0.04 K/UL (ref 0–0.2)
DIFFERENTIAL TYPE: ABNORMAL
EOSINOPHILS RELATIVE PERCENT: 1 % (ref 1–4)
HCG QUANTITATIVE: <1 IU/L
HCT VFR BLD CALC: 29.1 % (ref 36.3–47.1)
HEMOGLOBIN: 8.4 G/DL (ref 11.9–15.1)
IMMATURE GRANULOCYTES: 0 %
INR BLD: 1.1
LYMPHOCYTES # BLD: 36 % (ref 24–43)
MCH RBC QN AUTO: 23.7 PG (ref 25.2–33.5)
MCHC RBC AUTO-ENTMCNC: 28.9 G/DL (ref 28.4–34.8)
MCV RBC AUTO: 82.2 FL (ref 82.6–102.9)
MONOCYTES # BLD: 5 % (ref 3–12)
NRBC AUTOMATED: 0 PER 100 WBC
PARTIAL THROMBOPLASTIN TIME: 29.1 SEC (ref 23.9–33.8)
PDW BLD-RTO: 13.7 % (ref 11.8–14.4)
PLATELET # BLD: 302 K/UL (ref 138–453)
PLATELET ESTIMATE: ABNORMAL
PMV BLD AUTO: 11.3 FL (ref 8.1–13.5)
PROTHROMBIN TIME: 13.8 SEC (ref 11.5–14.2)
RBC # BLD: 3.54 M/UL (ref 3.95–5.11)
RBC # BLD: ABNORMAL 10*6/UL
SEG NEUTROPHILS: 57 % (ref 36–65)
SEGMENTED NEUTROPHILS ABSOLUTE COUNT: 3.27 K/UL (ref 1.5–8.1)
TSH SERPL DL<=0.05 MIU/L-ACNC: 0.39 MIU/L (ref 0.3–5)
WBC # BLD: 5.8 K/UL (ref 3.5–11.3)
WBC # BLD: ABNORMAL 10*3/UL

## 2021-04-12 PROCEDURE — 99385 PREV VISIT NEW AGE 18-39: CPT | Performed by: NURSE PRACTITIONER

## 2021-04-12 PROCEDURE — 36415 COLL VENOUS BLD VENIPUNCTURE: CPT

## 2021-04-12 PROCEDURE — 85730 THROMBOPLASTIN TIME PARTIAL: CPT

## 2021-04-12 PROCEDURE — 84443 ASSAY THYROID STIM HORMONE: CPT

## 2021-04-12 PROCEDURE — 84702 CHORIONIC GONADOTROPIN TEST: CPT

## 2021-04-12 PROCEDURE — 85025 COMPLETE CBC W/AUTO DIFF WBC: CPT

## 2021-04-12 PROCEDURE — 85610 PROTHROMBIN TIME: CPT

## 2021-04-12 ASSESSMENT — PATIENT HEALTH QUESTIONNAIRE - PHQ9
SUM OF ALL RESPONSES TO PHQ QUESTIONS 1-9: 0
SUM OF ALL RESPONSES TO PHQ QUESTIONS 1-9: 0
2. FEELING DOWN, DEPRESSED OR HOPELESS: 0
SUM OF ALL RESPONSES TO PHQ9 QUESTIONS 1 & 2: 0
1. LITTLE INTEREST OR PLEASURE IN DOING THINGS: 0

## 2021-04-12 NOTE — PROGRESS NOTES
History and Physical  [address], [address] [facility] [address] [phone]   Fax [phone]  Ada Figueroa  [birth year]21              31 y.o. Chief Complaint   Patient presents with   Gaylia Bosworth Doctor       Patient's last menstrual period was 2021. Primary Care Physician: Javier Ramirez, BENY - CNP    The patient was seen and examined. She has no chief complaint today and is here for her annual exam.  Her bowels are regular. There are no voiding complaints. She denies any bloating. She denies vaginal discharge and was counseled on STD's and the need for barrier contraception. HPI : Ada Figueroa is a 31 y.o. female [place]    Annual exam  Here to establish care. Referred by her hematologist, DR Trinidad. Starting iron infusions for anemia. Complaining of heavy menses for past 2 years. Periods occur every 17 days, lasting 5 days long. On heaviest days, changing super tampon every 2 hours. Previously on oral contraception 5 years ago. Does not want to go on any hormonal contraception to regulate menses due to side effects. Complaints of weight gain, fatigue, irritability while on the pill. Discussed different options and reports she is not interested. Currently sexually active with a female.    ________________________________________________________________________  OB History    Para Term  AB Living   0 0 0 0 0 0   SAB TAB Ectopic Molar Multiple Live Births   0 0 0 0 0 0     Past Medical History:   Diagnosis Date    Delta storage pool disease ([facility] [address] 2011    1.36 DG/PL/PT STATES RECEIVING PLATELETS PREOP PER DR. Farrell Check    Depression     Hematuria     History of chest pain     Hyperinsulinism     Hypertension     Insulin resistance     Leg lesion     Microcytic anemia     Obesity     Pain     RIGHT KNEE/HX DISLOCATION SEVERAL TIMES    S/P laparoscopic sleeve gastrectomy Intimate partner violence     Fear of current or ex partner: Not on file     Emotionally abused: Not on file     Physically abused: Not on file     Forced sexual activity: Not on file   Other Topics Concern    Not on file   Social History Narrative    ** Merged History Encounter **            MEDICATIONS:  Current Outpatient Medications   Medication Sig Dispense Refill    ferrous gluconate (FERGON) 324 (38 Fe) MG tablet take 1 tablet by mouth twice a day      vitamin B-12 (CYANOCOBALAMIN) 500 MCG tablet Take 1 tablet by mouth daily 30 tablet 3    ferrous gluconate 324 (37.5 Fe) MG TABS Take 1 tablet by mouth 2 times daily (Patient taking differently: Take 324 mg by mouth 2 times daily OTC) 60 tablet 3    vitamin D3 (CHOLECALCIFEROL) 25 MCG (1000 UT) TABS tablet Take 1 tablet by mouth daily 90 tablet 1     No current facility-administered medications for this visit. ALLERGIES:  Allergies as of 04/12/2021 - Review Complete 04/12/2021   Allergen Reaction Noted    Pcn [penicillins] Hives 10/02/2013       Symptoms of decreased mood absent  Symptoms of anhedonia absent    **If either question is answered in a  positive fashion then complete the PHQ9 Scoring Evaluation and make the appropriate referral**      Immunization status: stated as current, but no records available. Gynecologic History:  Menarche: 4 yo  Menopause at [address] [place] yo     Patient's last menstrual period was 03/21/2021. Sexually Active: yes  STD History: Yes  Hx HPV on pap smear- years ago     Permanent Sterilization: No   Reversible Birth Control: No        Hormone Replacement Exposure: No      Genetic Qualified Family History of Breast, Ovarian , Colon or Uterine Cancer: No     If YES see scanned worksheet.     Preventative Health Testing:    Health Maintenance:  Health Maintenance Due   Topic Date Due    Varicella vaccine (1 of 2 - 2-dose childhood series) Never done    HPV vaccine (1 - 2-dose series) Never done    COVID-19 Vaccine (1) Never done    DTaP/Tdap/Td vaccine (1 - Tdap) Never done    Cervical cancer screen  08/09/2020       Date of Last Pap Smear: years ago- no results available  Abnormal Pap Smear History: thinks she has had HPV- unsure of date, repeat pap was normal  Colposcopy History:   Date of Last Mammogram: NA  Date of Last Colonoscopy:   Date of Last Bone Density:      ________________________________________________________________________        REVIEW OF SYSTEMS:    yes   A minimum of an eleven point review of systems was completed. Review Of Systems (11 point):  Constitutional: No fever, chills or malaise; No weight change or fatigue  Head and Eyes: No vision, Headache, Dizziness or trauma in last 12 months  ENT ROS: No hearing, Tinnitis, sinus or taste problems  Hematological and Lymphatic ROS:No Lymphoma, Von Willebrand's, Hemophillia or Bleeding History. + delta storage pool disease  Psych ROS: No Depression, Homicidal thoughts,suicidal thoughts, or anxiety  Breast ROS: No prior breast abnormalities or lumps  Respiratory ROS: No SOB, Pneumoniae,Cough, or Pulmonary Embolism History  Cardiovascular ROS: No Chest Pain with Exertion, Palpitations, Syncope, Edema, Arrhythmia  Gastrointestinal ROS: No Indigestion, Heartburn, Nausea, vomiting, Diarrhea, Constipation,or Bowel Changes; No Bloody Stools or melena  Genito-Urinary ROS: No Dysuria, Hematuria or Nocturia.  No Urinary Incontinence or Vaginal Discharge  Musculoskeletal ROS: No Arthralgia, Arthritis,Gout,Osteoporosis or Rheumatism  Neurological ROS: No CVA, Migraines, Epilepsy, Seizure Hx, or Limb Weakness  Dermatological ROS: No Rash, Itching, Hives, Mole Changes or Cancer                                                                                                                                                                                                                                  PHYSICAL Exam:     Constitutional:  Vitals:    04/12/21 1150   BP: 120/60   Site: Right Upper Arm   Position: Sitting   Cuff Size: Medium Adult   Pulse: 74   Temp: 98.1 °F (36.7 °C)   TempSrc: Temporal   Weight: 164 lb 6.4 oz (74.6 kg)   Height: 5' 2.5\" (1.588 m)       Chaperone for Intimate Exam   Chaperone was offered and accepted as part of the rooming process.  Chaperone: Althea          General Appearance: This  is a well Developed, well Nourished, well groomed female. Her BMI was reviewed. Nutritional decision making was discussed. Skin:  There was a Normal Inspection of the skin without rashes or lesions. There were no rashes. (Papular, Maculopapular, Hives, Pustular, Macular)     There were no lesions (Ulcers, Erythema, Abn. Appearing Nevi)            Lymphatic:  No Lymph Nodes were Palpable in the neck , axilla or groin.  0 # Of Lymph Nodes; Location ; Character [Normal]  [Shotty] [Tender] [Enlarged]     Neck and EENT:  The neck was supple. There were no masses   The thyroid was not enlarged and had no masses. Perrla, EOMI B/L, TMI B/L No Abnormalities. Throat inspected-No exudates or Masses, Nares Patent No Masses        Respiratory: The lungs were auscultated and found to be clear. There were no rales, rhonchi or wheezes. There was a good respiratory effort. Cardiovascular: The heart was in a regular rate and rhythm. . No S3 or S4. There was no murmur appreciated. Location, grade, and radiation are not applicable. Extremities: The patients extremities were without calf tenderness, edema, or varicosities. There was full range of motion in all four extremities. Pulses in all four extremities were appreciated and are 2/4. Abdomen: The abdomen was soft and non-tender. There were good bowel sounds in all quadrants and there was no guarding, rebound or rigidity. On evaluation there was no evidence of hepatosplenomegaly and there was no costal vertebral princess tenderness bilaterally. No hernias were appreciated.      Abdominal Scars: intact Psych:  The patient had a normal Orientation to: Time, Place, Person, and Situation  There is no Mood / Affect changes    Breast:  (Chest)  normal appearance, no masses or tenderness  Self breast exams were reviewed in detail. Literature was given. Pelvic Exam:  Vulva and vagina appear normal. Bimanual exam reveals normal uterus and adnexa. Rectal Exam:  exam declined by patient          Musculosk:  Normal Gait and station was noted. Digits were evaluated without abnormal findings. Range of motion, stability and strength were evaluated and found to be appropriate for the patients age. ASSESSMENT:      31 y.o. Annual   Diagnosis Orders   1. Visit for gynecologic examination  PAP SMEAR   2. 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 Sahra Tineo    Depression     Hematuria     History of chest pain     Hyperinsulinism     Hypertension     Insulin resistance     Leg lesion     Microcytic anemia     Obesity     Pain     RIGHT KNEE/HX DISLOCATION SEVERAL TIMES    S/P laparoscopic sleeve gastrectomy 12-22-14    start wt = 237lb, Dr. Sirena Baer         Patient Active Problem List    Diagnosis Date Noted    Self-induced purging for weight loss 04/11/2018    Iron deficiency anemia 05/04/2017    Severe recurrent major depression without psychotic features ([address] 05/03/2017    Suicide gesture ([facility] [address] 05/02/2017    S/P laparoscopic sleeve gastrectomy 02/12/2015    Obesity     Depression     Hematuria     Delta storage pool disease     Myopia 08/23/2012          Hereditary Breast, Ovarian, Colon and Uterine Cancer screening Done. Tobacco & Secondary smoke risks reviewed; instructed on cessation and avoidance      Counseling Completed:  Preventative Health Recommendations and Follow up. The patient was informed of the recommended preventative health recommendations. 1. Annuals every year; Cytology collections per prevailing guidelines. 2. Mammograms begin every year at 37 yo if no abnormalities are found and no family history. 3. Bone density studies every 2-3 years. Begin at 71 yo. If no fracture history or osteoporosis family history. (significant). 4. Colonoscopy begin at 40 yo. Repeat every ten years if negative and no family history. 5. Calcium of 8657-1263 mg/day in split dosing  6. Vitamin D 400-800 IU/day  7. All other preventative health recommendations will be managed by the patients Primary care physician. PLAN:  Return in about 4 weeks (around 5/10/2021) for physician/ virtual visit/ heavy menses. Lab slips given. Pelvic ultrasound ordered. Follow up with physician to discuss results/ plan of care. Repeat Annual every 1 year  Cervical Cytology Evaluation begins at 24years old. If Negative Cytology, Follow-up screening per current guidelines. Mammograms every 1 year. If 37 yo and last mammogram was negative. Calcium and Vitamin D dosing reviewed. Colonoscopy screening reviewed as well as onset for bone density testing. Birth control and barrier recommendations discussed. STD counseling and prevention reviewed. Gardisil counseling completed for all patients 10-37 yo. Routine health maintenance per patients PCP.   Orders Placed This Encounter   Procedures    US PELVIS COMPLETE     Standing Status:   Future     Standing Expiration Date:   7/12/2021     Order Specific Question:   Reason for exam:     Answer:   heavy menses    US NON OB TRANSVAGINAL     Standing Status:   Future     Standing Expiration Date:   10/12/2021     Order Specific Question:   Reason for exam:     Answer:   heavy menses    PAP SMEAR     Patient History:    Patient's last menstrual period was 2021. OBGYN Status: Having periods  Past Surgical History:  No date: BARIATRIC SURGERY  14: OTHER SURGICAL HISTORY      Comment:  laparascopic Robotic Gastrectomy sleeve  No date: TONSILLECTOMY      Social History    Tobacco Use      Smoking status: Former Smoker        Packs/day: 0.50        Years: 1.00        Pack years: .5        Types: Cigarettes        Quit date: 2014        Years since quittin.8      Smokeless tobacco: Never Used       Standing Status:   Future     Standing Expiration Date:   2022     Order Specific Question:   Collection Type     Answer: Thin Prep     Order Specific Question:   Prior Abnormal Pap Test     Answer:   No     Order Specific Question:   Screening or Diagnostic     Answer:   Screening     Order Specific Question:   HPV Requested? Answer:   Yes     Order Specific Question:   High Risk Patient     Answer:   N/A    CBC Auto Differential     Standing Status:   Future     Standing Expiration Date:   2022    TSH with Reflex     Standing Status:   Future     Standing Expiration Date:   2022    HCG, Quantitative, Pregnancy     Standing Status:   Future     Standing Expiration Date:   2022    Protime-INR     Standing Status:   Future     Standing Expiration Date:   2022     Order Specific Question:   Daily Coumadin Dose? Answer:   N    APTT     Standing Status:   Future     Standing Expiration Date:   2022     Order Specific Question:   Daily Heparin Dose? Answer:   N           The patient, Alphonsus Sandhoff is a 31 y.o. female, was seen with a total time spent of 20 minutes for the visit on this date of service by the E/M provider. The time component had both face to face and non face to face time spent in determining the total time component.   Counseling and education regarding her diagnosis listed below and her options regarding those diagnoses were also included in determining her time component. Diagnosis Orders   1. Visit for gynecologic examination  PAP SMEAR   2. Menorrhagia with irregular cycle  US PELVIS COMPLETE    US NON OB TRANSVAGINAL    CBC Auto Differential    TSH with Reflex    HCG, Quantitative, Pregnancy    Protime-INR    APTT        The patient had her preventative health maintenance recommendations and follow-up reviewed with her at the completion of her visit.

## 2021-04-13 ENCOUNTER — TELEPHONE (OUTPATIENT)
Dept: OBGYN CLINIC | Age: 26
End: 2021-04-13

## 2021-04-13 ENCOUNTER — OFFICE VISIT (OUTPATIENT)
Dept: OBGYN CLINIC | Age: 26
End: 2021-04-13
Payer: COMMERCIAL

## 2021-04-13 DIAGNOSIS — N92.1 MENORRHAGIA WITH IRREGULAR CYCLE: ICD-10-CM

## 2021-04-13 PROCEDURE — 76856 US EXAM PELVIC COMPLETE: CPT | Performed by: OBSTETRICS & GYNECOLOGY

## 2021-04-13 PROCEDURE — 76830 TRANSVAGINAL US NON-OB: CPT | Performed by: OBSTETRICS & GYNECOLOGY

## 2021-04-13 NOTE — TELEPHONE ENCOUNTER
----- Message from BENY Holland CNP sent at 4/13/2021  7:27 AM EDT -----  Hga 8.4  RBC low 3.54  Continues to be anemic. Currently seeing hematologist, DR Plaza for management. Call patient and let patient know results- encourage patient to continue to follow up with hematology and please schedule patient for follow up with one of our physicians for heavy menses/anemia- did not schedule follow up yesterday when she left office.

## 2021-04-13 NOTE — TELEPHONE ENCOUNTER
Jason Payton pt notified of abnormal lab results. Pt encouraged to continue follow up appointments with Dr Kristal Bear, Hematology. Pt scheduled for follow up with Dr John Vegas for heavy menses/anemia. Pt to keep scheduled pelvic US in office today.

## 2021-04-13 NOTE — TELEPHONE ENCOUNTER
----- Message from Tomi Dubin, APRN - CNP sent at 4/13/2021  7:27 AM EDT -----  Hga 8.4  RBC low 3.54  Continues to be anemic. Currently seeing hematologist, DR Plaza for management. Call patient and let patient know results- encourage patient to continue to follow up with hematology and please schedule patient for follow up with one of our physicians for heavy menses/anemia- did not schedule follow up yesterday when she left office.

## 2021-04-16 ENCOUNTER — HOSPITAL ENCOUNTER (OUTPATIENT)
Dept: INFUSION THERAPY | Facility: MEDICAL CENTER | Age: 26
Discharge: HOME | End: 2021-04-16
Payer: COMMERCIAL

## 2021-04-16 VITALS
SYSTOLIC BLOOD PRESSURE: 106 MMHG | HEART RATE: 85 BPM | DIASTOLIC BLOOD PRESSURE: 60 MMHG | RESPIRATION RATE: 16 BRPM | TEMPERATURE: 99 F

## 2021-04-16 DIAGNOSIS — D50.9 IRON DEFICIENCY ANEMIA, UNSPECIFIED IRON DEFICIENCY ANEMIA TYPE: Primary | ICD-10-CM

## 2021-04-16 PROCEDURE — 2580000003 HC RX 258: Performed by: INTERNAL MEDICINE

## 2021-04-16 PROCEDURE — 6360000002 HC RX W HCPCS: Performed by: INTERNAL MEDICINE

## 2021-04-16 PROCEDURE — 96365 THER/PROPH/DIAG IV INF INIT: CPT

## 2021-04-16 RX ORDER — SODIUM CHLORIDE 9 MG/ML
INJECTION, SOLUTION INTRAVENOUS CONTINUOUS
Status: CANCELLED | OUTPATIENT
Start: 2021-04-23

## 2021-04-16 RX ORDER — DIPHENHYDRAMINE HYDROCHLORIDE 50 MG/ML
50 INJECTION INTRAMUSCULAR; INTRAVENOUS ONCE
Status: CANCELLED | OUTPATIENT
Start: 2021-04-23 | End: 2021-04-23

## 2021-04-16 RX ORDER — SODIUM CHLORIDE 9 MG/ML
INJECTION, SOLUTION INTRAVENOUS CONTINUOUS
Status: DISCONTINUED | OUTPATIENT
Start: 2021-04-16 | End: 2021-04-17 | Stop reason: HOSPADM

## 2021-04-16 RX ORDER — SODIUM CHLORIDE 0.9 % (FLUSH) 0.9 %
5 SYRINGE (ML) INJECTION PRN
Status: CANCELLED | OUTPATIENT
Start: 2021-04-23

## 2021-04-16 RX ORDER — HEPARIN SODIUM (PORCINE) LOCK FLUSH IV SOLN 100 UNIT/ML 100 UNIT/ML
500 SOLUTION INTRAVENOUS PRN
Status: CANCELLED | OUTPATIENT
Start: 2021-04-23

## 2021-04-16 RX ORDER — SODIUM CHLORIDE 0.9 % (FLUSH) 0.9 %
10 SYRINGE (ML) INJECTION PRN
Status: CANCELLED | OUTPATIENT
Start: 2021-04-23

## 2021-04-16 RX ORDER — METHYLPREDNISOLONE SODIUM SUCCINATE 125 MG/2ML
125 INJECTION, POWDER, LYOPHILIZED, FOR SOLUTION INTRAMUSCULAR; INTRAVENOUS ONCE
Status: CANCELLED | OUTPATIENT
Start: 2021-04-23 | End: 2021-04-23

## 2021-04-16 RX ADMIN — FERRIC CARBOXYMALTOSE INJECTION 750 MG: 50 INJECTION, SOLUTION INTRAVENOUS at 14:32

## 2021-04-16 RX ADMIN — SODIUM CHLORIDE: 9 INJECTION, SOLUTION INTRAVENOUS at 14:20

## 2021-04-16 NOTE — PROGRESS NOTES
Patient arrived ambulatory per self for infusion. Patient denies complaints or concerns. IV inserted per polocy. Injectafer infused with no sign adverse reaction. Rate decreased several times during infusion due to dull aching pain along vein. No sign infiltration. Patient observed for 30 minutes post infusion with running IV fluids. No sign adverse reaction. IV removed with pressure dressing applied to site. Patient ambulated off unit per self at discharge.

## 2021-04-20 DIAGNOSIS — Z01.419 VISIT FOR GYNECOLOGIC EXAMINATION: ICD-10-CM

## 2021-04-21 ENCOUNTER — TELEPHONE (OUTPATIENT)
Dept: OBGYN CLINIC | Age: 26
End: 2021-04-21

## 2021-04-21 NOTE — TELEPHONE ENCOUNTER
----- Message from BENY Lopez CNP sent at 4/21/2021 11:34 AM EDT -----  Pap smear neg  +HPV detected  AGE 25  needs colposcopy scheduled  +candida  Diflucan 150mg PO X 1 now and repeat in 7 days

## 2021-04-21 NOTE — TELEPHONE ENCOUNTER
LM for pt to call office regarding negative pap/+HPV and +candida. Pt will need Colposcopy scheduled with physician and will need diflucan 150mg.

## 2021-04-23 ENCOUNTER — HOSPITAL ENCOUNTER (OUTPATIENT)
Dept: INFUSION THERAPY | Facility: MEDICAL CENTER | Age: 26
Discharge: HOME | End: 2021-04-23
Payer: COMMERCIAL

## 2021-04-23 VITALS
RESPIRATION RATE: 16 BRPM | SYSTOLIC BLOOD PRESSURE: 117 MMHG | TEMPERATURE: 99.3 F | DIASTOLIC BLOOD PRESSURE: 41 MMHG | HEART RATE: 87 BPM

## 2021-04-23 DIAGNOSIS — D50.9 IRON DEFICIENCY ANEMIA, UNSPECIFIED IRON DEFICIENCY ANEMIA TYPE: Primary | ICD-10-CM

## 2021-04-23 PROCEDURE — 96365 THER/PROPH/DIAG IV INF INIT: CPT

## 2021-04-23 PROCEDURE — 2580000003 HC RX 258: Performed by: INTERNAL MEDICINE

## 2021-04-23 PROCEDURE — 96366 THER/PROPH/DIAG IV INF ADDON: CPT

## 2021-04-23 PROCEDURE — 6360000002 HC RX W HCPCS: Performed by: INTERNAL MEDICINE

## 2021-04-23 RX ORDER — SODIUM CHLORIDE 9 MG/ML
INJECTION, SOLUTION INTRAVENOUS CONTINUOUS
Status: CANCELLED | OUTPATIENT
Start: 2021-04-30

## 2021-04-23 RX ORDER — SODIUM CHLORIDE 9 MG/ML
INJECTION, SOLUTION INTRAVENOUS CONTINUOUS
Status: DISCONTINUED | OUTPATIENT
Start: 2021-04-23 | End: 2021-04-24 | Stop reason: HOSPADM

## 2021-04-23 RX ORDER — SODIUM CHLORIDE 0.9 % (FLUSH) 0.9 %
5 SYRINGE (ML) INJECTION PRN
Status: CANCELLED | OUTPATIENT
Start: 2021-04-30

## 2021-04-23 RX ORDER — SODIUM CHLORIDE 0.9 % (FLUSH) 0.9 %
10 SYRINGE (ML) INJECTION PRN
Status: CANCELLED | OUTPATIENT
Start: 2021-04-30

## 2021-04-23 RX ORDER — DIPHENHYDRAMINE HYDROCHLORIDE 50 MG/ML
50 INJECTION INTRAMUSCULAR; INTRAVENOUS ONCE
Status: CANCELLED | OUTPATIENT
Start: 2021-04-30 | End: 2021-04-30

## 2021-04-23 RX ORDER — HEPARIN SODIUM (PORCINE) LOCK FLUSH IV SOLN 100 UNIT/ML 100 UNIT/ML
500 SOLUTION INTRAVENOUS PRN
Status: CANCELLED | OUTPATIENT
Start: 2021-04-30

## 2021-04-23 RX ORDER — METHYLPREDNISOLONE SODIUM SUCCINATE 125 MG/2ML
125 INJECTION, POWDER, LYOPHILIZED, FOR SOLUTION INTRAMUSCULAR; INTRAVENOUS ONCE
Status: CANCELLED | OUTPATIENT
Start: 2021-04-30 | End: 2021-04-30

## 2021-04-23 RX ADMIN — FERRIC CARBOXYMALTOSE INJECTION 750 MG: 50 INJECTION, SOLUTION INTRAVENOUS at 14:21

## 2021-04-23 RX ADMIN — SODIUM CHLORIDE: 9 INJECTION, SOLUTION INTRAVENOUS at 14:22

## 2021-04-23 NOTE — PROGRESS NOTES
Pt arrive ambulatory for 2 of 2 injectafer infusion. Pt state that first infusion went well , no adverse reaction . Pt states that the first infusion was painful and had a burning sensation . Injectafer infusion ran concurrent with NS at 400 ml/hr , Injectafer ran at 350 ml/hr   Denies any complaint or concern. Vitals as charted. Peripheral IV established per policy. Injectafer infused with no sign of adverse reaction; line flushed. Intact IV catheter removed with pressure dressing applied.   Pt ambulate off unit at discharge

## 2021-04-23 NOTE — PLAN OF CARE
Problem: Safety:  Goal: Free from accidental physical injury  Description: Free from accidental physical injury  4/23/2021 1549 by Patti Barrera RN  Outcome: Completed  4/23/2021 1549 by Patti Barrera RN  Outcome: Met This Shift

## 2021-04-27 ENCOUNTER — TELEPHONE (OUTPATIENT)
Dept: OBGYN CLINIC | Age: 26
End: 2021-04-27

## 2021-04-28 ENCOUNTER — OFFICE VISIT (OUTPATIENT)
Dept: OBGYN CLINIC | Age: 26
End: 2021-04-28
Payer: COMMERCIAL

## 2021-04-28 VITALS
HEIGHT: 63 IN | SYSTOLIC BLOOD PRESSURE: 120 MMHG | WEIGHT: 163 LBS | BODY MASS INDEX: 28.88 KG/M2 | DIASTOLIC BLOOD PRESSURE: 72 MMHG

## 2021-04-28 DIAGNOSIS — N94.6 MENORRHALGIA: Primary | ICD-10-CM

## 2021-04-28 PROCEDURE — 99213 OFFICE O/P EST LOW 20 MIN: CPT | Performed by: OBSTETRICS & GYNECOLOGY

## 2021-04-28 RX ORDER — TRANEXAMIC ACID 650 1/1
650 TABLET ORAL 2 TIMES DAILY
Qty: 15 TABLET | Refills: 12 | Status: SHIPPED | OUTPATIENT
Start: 2021-04-28 | End: 2022-06-20

## 2021-04-28 NOTE — PROGRESS NOTES
Osman Bower [facility]  2021    YOB: 1995      HPI:  Megan Yoder is a 31 y.o. female      Patient with heavy menses every 16-21 days. She refuses to discuss any hormone related options and wants to discuss other options available to at least decrease volume at time of cycles and to also decrease pain related to cycles. Patient knows about her abnormal lab - unfortunately vonWillegrand antigen is not a diagnostic criteria for clotting or bleeding disorders, it is a reactant in acute phase inflammation, trauma, and can be elevated in physical or emotional stressors. If patient truly desires having bleeding disorder evaluation, the genetic variants and mutations would need to be elevated. All other labs were normal.    Patient also is aware of her NORMAL pap smear cytology but + HR HPV subtyping and wants to discuss results and further management. OB History    Para Term  AB Living   0 0 0 0 0 0   SAB TAB Ectopic Molar Multiple Live Births   0 0 0 0 0 0       Past Medical History:   Diagnosis Date    Delta storage pool disease ([facility] [address] 2011    1.36 DG/PL/PT STATES RECEIVING PLATELETS PREOP PER DR. Claudean Salina    Depression     Hematuria     History of chest pain     Hyperinsulinism     Hypertension     Insulin resistance     Leg lesion     Microcytic anemia     Obesity     Pain     RIGHT KNEE/HX DISLOCATION SEVERAL TIMES    S/P laparoscopic sleeve gastrectomy 14    start wt = 237lb, Dr. Blair Pham       Past Surgical History:   Procedure Laterality Date    BARIATRIC SURGERY      OTHER SURGICAL HISTORY  14    laparascopic Robotic Gastrectomy sleeve    TONSILLECTOMY         Family History   Problem Relation Age of Onset    Bipolar Disorder Mother     Arthritis Mother     Depression Mother     High Blood Pressure Mother     High Cholesterol Mother     Mental Illness Mother     Stroke Mother     Clotting Disorder Father     Depression Sister     Diabetes Maternal Aunt     Cancer Maternal Grandmother         pheochromocytoma- 61       Social History     Socioeconomic History    Marital status: Single     Spouse name: Not on file    Number of children: Not on file    Years of education: Not on file    Highest education level: Not on file   Occupational History    Occupation:      Employer: [employer]. Financial resource strain: Not on file    Food insecurity     Worry: Not on file     Inability: Not on file   [employer] needs     Medical: Not on file     Non-medical: Not on file   Tobacco Use    Smoking status: Former Smoker     Packs/day: 0.50     Years: 1.00     Pack years: 0.50     Types: Cigarettes     Quit date: 2014     Years since quittin.9    Smokeless tobacco: Never Used   Substance and Sexual Activity    Alcohol use: Yes     Comment: social    Drug use: No    Sexual activity: Yes     Partners: Male   Lifestyle    Physical activity     Days per week: Not on file     Minutes per session: Not on file    Stress: Not on file   Relationships    Social connections     Talks on phone: Not on file     Gets together: Not on file     Attends [employer] service: Not on file     Active member of club or organization: Not on file     Attends meetings of clubs or organizations: Not on file     Relationship status: Not on file    Intimate partner violence     Fear of current or ex partner: Not on file     Emotionally abused: Not on file     Physically abused: Not on file     Forced sexual activity: Not on file   Other Topics Concern    Not on file   Social History Narrative    ** Merged History Encounter **              MEDICATIONS:  Current Outpatient Medications   Medication Sig Dispense Refill    tranexamic acid (LYSTEDA) 650 MG TABS tablet Take 1 tablet by mouth 2 times daily for 5 days Take medication at onset of menses and for up to 5 days of cycle 15 tablet 12 guarding, rebound or rigidity. No CVA tenderness bilaterally. Extremities: No calf tenderness, DTR 2/4, and No edema bilaterally    Pelvic: Exam deferred. Diagnostics:  Mri Lumbar Spine Wo Contrast    Result Date: 4/3/2021  EXAMINATION: MRI OF THE LUMBAR SPINE WITHOUT CONTRAST, 4/3/2021 2:59 pm TECHNIQUE: Multiplanar multisequence MRI of the lumbar spine was performed without the administration of intravenous contrast. COMPARISON: Lumbar MRI 11/13/2015 HISTORY: ORDERING SYSTEM PROVIDED HISTORY: Sacroiliac joint dysfunction TECHNOLOGIST PROVIDED HISTORY: Is the patient pregnant?->No Reason for Exam: SI joint dysfunction. low back pain with right leg radiculopathy Acuity: Chronic Type of Exam: Subsequent/Follow-up FINDINGS: BONES/ALIGNMENT: There is normal alignment of the spine. The vertebral body heights are maintained. The bone marrow signal appears unremarkable. SPINAL CORD: The conus terminates normally. SOFT TISSUES: No paraspinal mass identified. L1-L2: There is no significant disc herniation, spinal canal stenosis or neural foraminal narrowing. L2-L3: There is no significant disc herniation, spinal canal stenosis or neural foraminal narrowing. L3-L4: Subtle disc bulging. Mild bilateral facet arthropathy. Mild bilateral neural foraminal narrowing L4-L5: Mild loss of disc signal.  2 mm disc bulging effaces the anterior thecal sac. Moderate bilateral facet arthropathy. Moderate bilateral neural foraminal narrowing. L5-S1: 2 mm disc bulging effaces the anterior thecal sac.  5 mm left foraminal disc protrusion impinges on the exiting left L5 nerve root. Mild bilateral facet arthropathy. Severe left and moderate right neural foraminal narrowing. Sacroiliac joints are only partially visualized and evaluated but appear unremarkable. No significant change since prior lumbar MRI 2015.  At L5-S1, severe left and moderate right neural foraminal narrowing, left foraminal disc protrusion impinges on the exiting left L5 nerve root. Sacroiliac joints are only partially visualized and evaluated but appear unremarkable. Us Non Ob Transvaginal    Result Date: 4/13/2021  GYNECOLOGY ULTRASOUND REPORT [facility] [address] [phone] mn [phone] Fax 4/13/2021 MRN: [id number] Contact Serial #: [id number] Nisha Cota YOB: 1995 Age: 31 y.o. The ultrasound images were reviewed. Please see the attached ultrasound report. Ultrasonographer: Amita Figueroa RDMS Assessment: Nisha Cota is a 31 y.o. female Menorrhagia with irregular cycle Specific Ultrasound Imaging Obtained: Transabdominal Approach: Yes Transvaginal Approach: Yes Limitations of Study Encountered: Overlying bowel & gas limiting the study: No Poor prep for procedure limiting study: No Elevated BMI limiting study: No Ovaries are NOT seen on Transabdominal imaging. Transvaginal imaging required: Yes Findings: 1. The Uterus is homogeneous and anteverted (6.84 x 3.98 x 3.46 cm) 2. The Endometrial Stripe measurement is 0.83 cm 3. The Left Ovary is without masses or cysts 4. The Right Ovary is without masses or cysts 5. There is not an abnormal amount of cul-de-sac fluid Electronically signed by Tabitha Smart DO on 4/13/21 at 12:45 PM EDT     1. The Uterus is homogeneous and anteverted (6.84 x 3.98 x 3.46 cm) 2. The Endometrial Stripe measurement is 0.83 cm 3. The Left Ovary is without masses or cysts 4. The Right Ovary is without masses or cysts 5.  There is not an abnormal amount of cul-de-sac fluid    Us Pelvis Complete    Result Date: 4/13/2021  See transvaginal ultrasound report from same day      Lab Results:  Results for orders placed or performed during the hospital encounter of 04/12/21   CBC Auto Differential   Result Value Ref Range    WBC 5.8 3.5 - 11.3 k/uL    RBC 3.54 (L) 3.95 - 5.11 m/uL    Hemoglobin 8.4 (L) 11.9 - 15.1 g/dL    Hematocrit 29.1 (L) 36.3 - 47.1 %    MCV 82.2 (L) 82.6 - 102.9 fL    MCH 23.7 (L) 25.2 - 33.5 pg    MCHC 28.9 28.4 - 34.8 g/dL    RDW 13.7 11.8 - 14.4 %    Platelets 664 327 - 905 k/uL    MPV 11.3 8.1 - 13.5 fL    NRBC Automated 0.0 0.0 per 100 WBC    Differential Type NOT REPORTED     Seg Neutrophils 57 36 - 65 %    Lymphocytes 36 24 - 43 %    Monocytes 5 3 - 12 %    Eosinophils % 1 1 - 4 %    Basophils 1 0 - 2 %    Immature Granulocytes 0 0 %    Segs Absolute 3.27 1.50 - 8.10 k/uL    Absolute Lymph # 2.08 1.10 - 3.70 k/uL    Absolute Mono # 0.29 0.10 - 1.20 k/uL    Absolute Eos # 0.06 0.00 - 0.44 k/uL    Basophils Absolute 0.04 0.00 - 0.20 k/uL    Absolute Immature Granulocyte <0.03 0.00 - 0.30 k/uL    WBC Morphology NOT REPORTED     RBC Morphology MICROCYTOSIS PRESENT     Platelet Estimate NOT REPORTED    TSH with Reflex   Result Value Ref Range    TSH 0.39 0.30 - 5.00 mIU/L   HCG, Quantitative, Pregnancy   Result Value Ref Range    hCG Quant <1 <5 IU/L   Protime-INR   Result Value Ref Range    Protime 13.8 11.5 - 14.2 sec    INR 1.1    APTT   Result Value Ref Range    PTT 29.1 23.9 - 33.8 sec         Assessment:  menometorrhagia  Patient Active Problem List    Diagnosis Date Noted    Self-induced purging for weight loss 04/11/2018    Iron deficiency anemia 05/04/2017    Severe recurrent major depression without psychotic features ([facility] [address] 05/03/2017    Suicide gesture ([facility] [address] 05/02/2017    S/P laparoscopic sleeve gastrectomy 02/12/2015    Obesity     Depression     Hematuria     Delta storage pool disease     Myopia 08/23/2012           PLAN:  Patient provided with ASCCP directed algorithm for normal pap smear with + HR HPV and cotesting with repeat pap smear in 12-36 months. Discussed use of TXA for cycle pain and volume, unfortunately, this will not change the overall cycle length from start to start time, but can help with her complaint of volume, clots and pain.   RTO as needed

## 2021-06-10 ENCOUNTER — TELEMEDICINE (OUTPATIENT)
Dept: FAMILY MEDICINE CLINIC | Age: 26
End: 2021-06-10

## 2021-06-10 DIAGNOSIS — F41.9 ANXIETY: Primary | ICD-10-CM

## 2021-06-10 PROCEDURE — 99213 OFFICE O/P EST LOW 20 MIN: CPT | Performed by: NURSE PRACTITIONER

## 2021-06-10 RX ORDER — PAROXETINE 10 MG/1
10 TABLET, FILM COATED ORAL DAILY
Qty: 30 TABLET | Refills: 1 | Status: SHIPPED | OUTPATIENT
Start: 2021-06-10 | End: 2021-08-05 | Stop reason: SDUPTHER

## 2021-06-10 RX ORDER — HYDROXYZINE HYDROCHLORIDE 25 MG/1
25 TABLET, FILM COATED ORAL EVERY 8 HOURS PRN
Qty: 30 TABLET | Refills: 0 | Status: SHIPPED | OUTPATIENT
Start: 2021-06-10 | End: 2021-08-05

## 2021-06-10 SDOH — ECONOMIC STABILITY: FOOD INSECURITY: WITHIN THE PAST 12 MONTHS, THE FOOD YOU BOUGHT JUST DIDN'T LAST AND YOU DIDN'T HAVE MONEY TO GET MORE.: NEVER TRUE

## 2021-06-10 SDOH — ECONOMIC STABILITY: FOOD INSECURITY: WITHIN THE PAST 12 MONTHS, YOU WORRIED THAT YOUR FOOD WOULD RUN OUT BEFORE YOU GOT MONEY TO BUY MORE.: NEVER TRUE

## 2021-06-10 ASSESSMENT — SOCIAL DETERMINANTS OF HEALTH (SDOH): HOW HARD IS IT FOR YOU TO PAY FOR THE VERY BASICS LIKE FOOD, HOUSING, MEDICAL CARE, AND HEATING?: NOT HARD AT ALL

## 2021-06-10 ASSESSMENT — ENCOUNTER SYMPTOMS
COUGH: 0
SHORTNESS OF BREATH: 0

## 2021-06-10 NOTE — PROGRESS NOTES
Patient is present to discuss medication  States her therapist suggested she start a medication for her anxiety  Patient states she has been on several meds in the past

## 2021-06-10 NOTE — PROGRESS NOTES
VISIT LOCATION: Home    TELEHEALTH EVALUATION -- Audio/Visual (During [id number] public health emergency)    Due to COVID 23 outbreak, patient's office visit was converted to a virtual visit. Patient was contacted and agreed to proceed with a virtual visit via [address] Visit  The risks and benefits of converting to a virtual visit were discussed in light of the current infectious disease epidemic. Patient also understood that insurance coverage and co-pays are up to their individual insurance plans. Ada Figueroa (:  1995) has requested an audio/video evaluation for the following concern(s):    Chief Complaint:   HPI:  Pt is here to disccuss anxiety. She is going to see a therapist. She has been seeing a therapist for a month. Pt states she has a dx of bettye. States this is different than she has experienced anxiety in the past.  She lacks motivation. She is too worried about things to get things done. This problem is happening all the time, everyday. Not having panic attacks. Doesn't feel she is depressed. Gets sp worked up she can't do anything. She has used celexa, trazadone, wellbutrin, zoloft, effexor in the past.  She states at the time she was on the meds she was also having depression. Review of Systems   Constitutional: Negative for chills and fever. Respiratory: Negative for cough and shortness of breath. Cardiovascular: Negative for chest pain, palpitations and leg swelling. Psychiatric/Behavioral: Negative for dysphoric mood. The patient is nervous/anxious. Prior to Visit Medications    Medication Sig Taking?  Authorizing Provider   PARoxetine (PAXIL) 10 MG tablet Take 1 tablet by mouth daily Yes , APRN - CNP   hydrOXYzine (ATARAX) 25 MG tablet Take 1 tablet by mouth every 8 hours as needed for Anxiety Yes , APRN - CNP   tranexamic acid (LYSTEDA) 650 MG TABS tablet Take 1 tablet by mouth 2 times daily for 5 days Take medication at onset of menses and for up to 5 days of cycle Yes Bennye Dines, DO   vitamin B-12 (CYANOCOBALAMIN) 500 MCG tablet Take 1 tablet by mouth daily Yes Cony Chanel MD   ferrous gluconate (FERGON) 324 (38 Fe) MG tablet take 1 tablet by mouth twice a day  Historical Provider, MD     Social- none    Past Medical History:   Diagnosis Date    Delta storage pool disease ([facility] [address] 5/2011    1.36 DG/PL/PT STATES RECEIVING PLATELETS PREOP PER DR. Ku Ormichael    Depression     Hematuria     History of chest pain     Hyperinsulinism     Hypertension     Insulin resistance     Leg lesion     Microcytic anemia     Obesity     Pain     RIGHT KNEE/HX DISLOCATION SEVERAL TIMES    S/P laparoscopic sleeve gastrectomy 12-22-14    start wt = 237lb, Dr. Cristofer Figueroa   ,   Past Surgical History:   Procedure Laterality Date    BARIATRIC SURGERY      OTHER SURGICAL HISTORY  12/22/14    laparascopic Robotic Gastrectomy sleeve    TONSILLECTOMY               [] OTHER:    Constitutional: [x] Appears well-developed and well-nourished [] No apparent distress                            [] Abnormal-   Mental status  [x] Alert and awake  [x] Oriented to person/place/time [x]Able to follow commands       Eyes:  EOM    [x]  Normal  [] Abnormal-  Sclera  [x]  Normal  [] Abnormal -         Discharge [x]  None visible  [] Abnormal -     HENT:   [x] Normocephalic, atraumatic.   [] Abnormal   [] Mouth/Throat: Mucous membranes are moist.      External Ears [x] Normal  [] Abnormal-      Neck: [x] No visualized mass      Pulmonary/Chest: [x] Respiratory effort normal.  [] No visualized signs of difficulty breathing or respiratory distress        [] Abnormal-      Musculoskeletal:   [] Normal gait with no signs of ataxia         [x] Normal range of motion of neck        [] Abnormal-   [] Motor grossly intact in visible upper extremities    [] Motor grossly intact in visible lower extremities        Neurological:        [x] No Facial Asymmetry (Cranial nerve 7 motor function) (limited exam to video visit)                       [x] No gaze palsy        [] Abnormal-         Skin:                     [x] No significant exanthematous lesions or discoloration noted on facial skin         [] Abnormal-                                  Psychiatric:           [x] Normal Affect [] No Hallucinations        [] Abnormal- [] Normal Mood  [] Anxious appearing    [] Depressed appearing  [] Confused appearing      Due to this being a TeleHealth encounter, evaluation of the following organ systems is limited: Vitals/Constitutional/EENT/Resp/CV/GI//MS/Neuro/Skin/Heme-Lymph-Imm. ASSESSMENT/PLAN:  Encounter Diagnosis   Name Primary?  Anxiety Yes   discussed use and s/e of meds     Orders Placed This Encounter   Medications    PARoxetine (PAXIL) 10 MG tablet     Sig: Take 1 tablet by mouth daily     Dispense:  30 tablet     Refill:  1    hydrOXYzine (ATARAX) 25 MG tablet     Sig: Take 1 tablet by mouth every 8 hours as needed for Anxiety     Dispense:  30 tablet     Refill:  0         Return in about 1 month (around 7/10/2021). An  electronic signature was used to authenticate this note. --BENY Herrera CNP on 6/10/2021 at 12:57 PM        Pursuant to the emergency declaration under the [facility] waiver authority and the [employer] Act, this Virtual  Visit was conducted, with patient's consent, to reduce the patient's risk of exposure to COVID-19 and provide continuity of care for an established patient. Services were provided through a telephone discussion virtually to substitute for in-person clinic visit.

## 2021-06-11 ENCOUNTER — HOSPITAL ENCOUNTER (OUTPATIENT)
Age: 26
Discharge: HOME | End: 2021-06-11

## 2021-06-11 ENCOUNTER — HOSPITAL ENCOUNTER (OUTPATIENT)
Facility: MEDICAL CENTER | Age: 26
End: 2021-06-11
Payer: COMMERCIAL

## 2021-06-11 DIAGNOSIS — D50.9 IRON DEFICIENCY ANEMIA, UNSPECIFIED IRON DEFICIENCY ANEMIA TYPE: ICD-10-CM

## 2021-06-11 DIAGNOSIS — D69.1 DELTA STORAGE POOL DISEASE (HCC): ICD-10-CM

## 2021-06-11 LAB
ABSOLUTE EOS #: 0.04 K/UL (ref 0–0.44)
ABSOLUTE IMMATURE GRANULOCYTE: <0.03 K/UL (ref 0–0.3)
ABSOLUTE LYMPH #: 2.09 K/UL (ref 1.1–3.7)
ABSOLUTE MONO #: 0.3 K/UL (ref 0.1–1.2)
BASOPHILS # BLD: 1 % (ref 0–2)
BASOPHILS ABSOLUTE: 0.06 K/UL (ref 0–0.2)
DIFFERENTIAL TYPE: ABNORMAL
EOSINOPHILS RELATIVE PERCENT: 1 % (ref 1–4)
FERRITIN: 116 UG/L (ref 13–150)
HCT VFR BLD CALC: 39.6 % (ref 36.3–47.1)
HEMOGLOBIN: 12.6 G/DL (ref 11.9–15.1)
IMMATURE GRANULOCYTES: 0 %
IRON SATURATION: 26 % (ref 20–55)
IRON: 83 UG/DL (ref 37–145)
LYMPHOCYTES # BLD: 39 % (ref 24–43)
MCH RBC QN AUTO: 29.5 PG (ref 25.2–33.5)
MCHC RBC AUTO-ENTMCNC: 31.8 G/DL (ref 28.4–34.8)
MCV RBC AUTO: 92.7 FL (ref 82.6–102.9)
MONOCYTES # BLD: 6 % (ref 3–12)
NRBC AUTOMATED: 0 PER 100 WBC
PDW BLD-RTO: 18.2 % (ref 11.8–14.4)
PLATELET # BLD: 305 K/UL (ref 138–453)
PLATELET ESTIMATE: ABNORMAL
PMV BLD AUTO: 10.6 FL (ref 8.1–13.5)
RBC # BLD: 4.27 M/UL (ref 3.95–5.11)
RBC # BLD: ABNORMAL 10*6/UL
SEG NEUTROPHILS: 53 % (ref 36–65)
SEGMENTED NEUTROPHILS ABSOLUTE COUNT: 2.87 K/UL (ref 1.5–8.1)
TOTAL IRON BINDING CAPACITY: 316 UG/DL (ref 250–450)
UNSATURATED IRON BINDING CAPACITY: 233 UG/DL (ref 112–347)
WBC # BLD: 5.4 K/UL (ref 3.5–11.3)
WBC # BLD: ABNORMAL 10*3/UL

## 2021-06-11 PROCEDURE — 83550 IRON BINDING TEST: CPT

## 2021-06-11 PROCEDURE — 85025 COMPLETE CBC W/AUTO DIFF WBC: CPT

## 2021-06-11 PROCEDURE — 36415 COLL VENOUS BLD VENIPUNCTURE: CPT

## 2021-06-11 PROCEDURE — 82728 ASSAY OF FERRITIN: CPT

## 2021-06-11 PROCEDURE — 83540 ASSAY OF IRON: CPT

## 2021-06-18 ENCOUNTER — TELEPHONE (OUTPATIENT)
Dept: ONCOLOGY | Age: 26
End: 2021-06-18

## 2021-06-18 ENCOUNTER — OFFICE VISIT (OUTPATIENT)
Dept: ONCOLOGY | Age: 26
End: 2021-06-18

## 2021-06-18 VITALS
SYSTOLIC BLOOD PRESSURE: 125 MMHG | WEIGHT: 167 LBS | BODY MASS INDEX: 30.06 KG/M2 | HEART RATE: 83 BPM | TEMPERATURE: 98.3 F | DIASTOLIC BLOOD PRESSURE: 72 MMHG | RESPIRATION RATE: 18 BRPM

## 2021-06-18 DIAGNOSIS — D69.1 DELTA STORAGE POOL DISEASE (HCC): Primary | ICD-10-CM

## 2021-06-18 DIAGNOSIS — D50.9 IRON DEFICIENCY ANEMIA, UNSPECIFIED IRON DEFICIENCY ANEMIA TYPE: ICD-10-CM

## 2021-06-18 PROCEDURE — 99211 OFF/OP EST MAY X REQ PHY/QHP: CPT

## 2021-06-18 PROCEDURE — 99214 OFFICE O/P EST MOD 30 MIN: CPT | Performed by: INTERNAL MEDICINE

## 2021-06-18 NOTE — PROGRESS NOTES
DIAGNOSIS:   1. Anemia  2. Menorrhagia  3. Low B12   4. possible bleeding disorder, but work-up essentially is negative. CURRENT THERAPY:  Plan for oral iron and B12  Work-up is negative for bleeding  Disorder  Did not respond  to oral iron, good response to IV iron    BRIEF CASE HISTORY:   Artemio Tsai is a very pleasant 31 y.o. female who is referred to us for anemia. She reports she has history of low iron. She had hematuria as a ped and diagnosed with bleeding disorder. She has not had any hematuria or hematochezia as an adult. She denies any epistaxis or bleeding with brushing. She does feel she clots slowly with cuts. She has not been hospitalized with bleeding or received transfusion. She has history of menorrhagia with clotting, her cycles are every 25 days, she is not currently on birth control or using IUD, no uterine ablation, not currently trying to get pregnant. She follows vegan diet, was not taking B12. The patient was started on oral B12 and iron. She underwent a work-up for bleeding disorder, von Willebrand disease was excluded, platelet function was normal.  It was thought that her menorrhagia is due to local causes or hormone issues rather than bleeding dysfunction   Patient received oral iron for about 3 to 4 months without response so we offered her IV iron. INTERIM HISTORY: The patient comes in today for a follow-up to review lab work following IV iron. She reports she is feeling better and has no new complaints or concerns. She consulted with gynecology and was started on Tranexamic acid take during menses, too early to tell if effective. PAST MEDICAL HISTORY: has a past medical history of Delta storage pool disease [facility]), Depression, Hematuria, History of chest pain, Hyperinsulinism, Hypertension, Insulin resistance, Leg lesion, Microcytic anemia, Obesity, Pain, and S/P laparoscopic sleeve gastrectomy.     PAST SURGICAL HISTORY: has a past surgical history that Clear to auscultation bilaterally. Heart: Regular without any murmur. Abdomen: Soft, nontender. No hepatosplenomegaly. Extremities: Lower extremities show no edema, clubbing, or cyanosis. Breasts: Examination not done today. Neuro exam: intact cranial nerves bilaterally no motor or sensory deficit, gait is normal. Lymphatic: no adenopathy appreciated in the supraclavicular, axillary, cervical or inguinal area    REVIEW OF LABORATORY DATA:   Lab Results   Component Value Date    WBC 5.4 06/11/2021    HGB 12.6 06/11/2021    HCT 39.6 06/11/2021    MCV 92.7 06/11/2021     06/11/2021       Chemistry        Component Value Date/Time     11/10/2020 0810    K 3.8 11/10/2020 0810     11/10/2020 0810    CO2 21 11/10/2020 0810    BUN 8 11/10/2020 0810    CREATININE 0.58 11/10/2020 0810        Component Value Date/Time    CALCIUM 8.9 11/10/2020 0810    ALKPHOS 48 11/10/2020 0810    AST 40 (H) 11/10/2020 0810    ALT 38 (H) 11/10/2020 0810    BILITOT 0.48 11/10/2020 0810        Lab Results   Component Value Date    IRON 83 06/11/2021    TIBC 316 06/11/2021    FERRITIN 116 06/11/2021         REVIEW OF RADIOLOGICAL RESULTS:     IMPRESSION:   1. Iron deficiency anemia  2. Menorrhagia  3. Low B12  4. No response to oral iron, plan IV iron    PLAN:   1. We reviewed her lab work which shows normalized iron and HGB. 2. Her symptoms have resolved. 3. We discussed follow up plan. 4. I recommend she take oral iron daily to maintain. 5. Return in 1 year with lab work, sooner if clinically indicated.

## 2021-08-05 ENCOUNTER — TELEMEDICINE (OUTPATIENT)
Dept: FAMILY MEDICINE CLINIC | Age: 26
End: 2021-08-05

## 2021-08-05 DIAGNOSIS — F41.9 ANXIETY: Primary | ICD-10-CM

## 2021-08-05 PROCEDURE — 99213 OFFICE O/P EST LOW 20 MIN: CPT | Performed by: NURSE PRACTITIONER

## 2021-08-05 RX ORDER — PAROXETINE HYDROCHLORIDE 20 MG/1
20 TABLET, FILM COATED ORAL DAILY
Qty: 30 TABLET | Refills: 2 | Status: SHIPPED | OUTPATIENT
Start: 2021-08-05 | End: 2022-06-20

## 2021-08-05 RX ORDER — BUSPIRONE HYDROCHLORIDE 5 MG/1
5 TABLET ORAL 2 TIMES DAILY
Qty: 60 TABLET | Refills: 2 | Status: SHIPPED | OUTPATIENT
Start: 2021-08-05 | End: 2021-09-04

## 2021-08-05 ASSESSMENT — ENCOUNTER SYMPTOMS
COUGH: 0
SHORTNESS OF BREATH: 0

## 2021-08-05 NOTE — PROGRESS NOTES
VISIT LOCATION: Home    TELEHEALTH EVALUATION -- Audio/Visual (During MISMQ-60 public health emergency)    Due to COVID 23 outbreak, patient's office visit was converted to a virtual visit. Patient was contacted and agreed to proceed with a virtual visit via [address] Visit  The risks and benefits of converting to a virtual visit were discussed in light of the current infectious disease epidemic. Patient also understood that insurance coverage and co-pays are up to their individual insurance plans. Zachary Domínguez (:  1995) has requested an audio/video evaluation for the following concern(s):    Chief Complaint:   HPI:  Rubén  is here for f/u mood;  Per last office visit; Pt is here to disccuss anxiety. She is going to see a therapist. She has been seeing a therapist for a month. Pt states she has a dx of bettye. States this is different than she has experienced anxiety in the past.  She lacks motivation. She is too worried about things to get things done. This problem is happening all the time, everyday. Not having panic attacks. Doesn't feel she is depressed. Gets sp worked up she can't do anything. She has used celexa, trazadone, wellbutrin, zoloft, effexor in the past.  She states at the time she was on the meds she was also having depression. She is feeling ok but she would like to go up on the paxil. She does not feel the hydroxyzine is helping. She is using voice memos, walking dog for healthy coping mechanism. She denies thoughts of self harm. She sees her counselor this afternoon. Review of Systems   Constitutional: Negative for chills and fever. Respiratory: Negative for cough and shortness of breath. Cardiovascular: Negative for chest pain, palpitations and leg swelling. Prior to Visit Medications    Medication Sig Taking?  Authorizing Provider   busPIRone (BUSPAR) 5 MG tablet Take 1 tablet by mouth 2 times daily Yes BENY Garcia - CNP PARoxetine (PAXIL) 20 MG tablet Take 1 tablet by mouth daily Yes BENY Mercedes CNP   Ferrous Gluconate (IRON 27 PO) Take by mouth OTC / unsure of mg Yes Historical Provider, MD   hydrOXYzine (ATARAX) 25 MG tablet Take 1 tablet by mouth every 8 hours as needed for Anxiety Yes BENY Mercedes CNP   tranexamic acid (LYSTEDA) 650 MG TABS tablet Take 1 tablet by mouth 2 times daily for 5 days Take medication at onset of menses and for up to 5 days of cycle Yes Demaris Suzy, DO   vitamin B-12 (CYANOCOBALAMIN) 500 MCG tablet Take 1 tablet by mouth daily Yes Arden Rizzo MD     Social- none    Past Medical History:   Diagnosis Date    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 Marie Agent    Depression     Hematuria     History of chest pain     Hyperinsulinism     Hypertension     Insulin resistance     Leg lesion     Microcytic anemia     Obesity     Pain     RIGHT KNEE/HX DISLOCATION SEVERAL TIMES    S/P laparoscopic sleeve gastrectomy 12-22-14    start wt = 237lb, Dr. Dolores Parker   ,   Past Surgical History:   Procedure Laterality Date    BARIATRIC SURGERY      OTHER SURGICAL HISTORY  12/22/14    laparascopic Robotic Gastrectomy sleeve    TONSILLECTOMY               [] OTHER:    Constitutional: [x] Appears well-developed and well-nourished [] No apparent distress                            [] Abnormal-   Mental status  [x] Alert and awake  [x] Oriented to person/place/time [x]Able to follow commands       Eyes:  EOM    [x]  Normal  [] Abnormal-  Sclera  [x]  Normal  [] Abnormal -         Discharge [x]  None visible  [] Abnormal -     HENT:   [x] Normocephalic, atraumatic.   [] Abnormal   [] Mouth/Throat: Mucous membranes are moist.      External Ears [x] Normal  [] Abnormal-      Neck: [x] No visualized mass      Pulmonary/Chest: [x] Respiratory effort normal.  [] No visualized signs of difficulty breathing or respiratory distress        [] Abnormal-      Musculoskeletal:   [] Normal gait with no signs of ataxia         [x] Normal range of motion of neck        [] Abnormal-   [] Motor grossly intact in visible upper extremities    [] Motor grossly intact in visible lower extremities        Neurological:        [x] No Facial Asymmetry (Cranial nerve 7 motor function) (limited exam to video visit)                       [x] No gaze palsy        [] Abnormal-         Skin:                     [x] No significant exanthematous lesions or discoloration noted on facial skin         [] Abnormal-                                  Psychiatric:           [x] Normal Affect [] No Hallucinations        [] Abnormal- [] Normal Mood  [] Anxious appearing    [] Depressed appearing  [] Confused appearing      Due to this being a TeleHealth encounter, evaluation of the following organ systems is limited: Vitals/Constitutional/EENT/Resp/CV/GI//MS/Neuro/Skin/Heme-Lymph-Imm. ASSESSMENT/PLAN:  No diagnosis found. Orders Placed This Encounter   Medications    busPIRone (BUSPAR) 5 MG tablet     Sig: Take 1 tablet by mouth 2 times daily     Dispense:  60 tablet     Refill:  2    PARoxetine (PAXIL) 20 MG tablet     Sig: Take 1 tablet by mouth daily     Dispense:  30 tablet     Refill:  2     Switch hydroxyzine to buspar  Increase paxil  Continue f/u with counseling. Return in about 1 month (around 9/5/2021). An  electronic signature was used to authenticate this note. --BENY Birmingham - CNP on 8/5/2021 at 2:52 PM        Pursuant to the emergency declaration under the [facility], 1135 waiver authority and the [employer] and [employer] Act, this Virtual  Visit was conducted, with patient's consent, to reduce the patient's risk of exposure to COVID-19 and provide continuity of care for an established patient.     Services were provided through a telephone discussion virtually to substitute for in-person clinic visit.

## 2021-08-25 ENCOUNTER — TELEPHONE (OUTPATIENT)
Dept: ONCOLOGY | Age: 26
End: 2021-08-25

## 2021-08-25 NOTE — TELEPHONE ENCOUNTER
PATIENT WAS CALLED ON 8/24/21, AS AFTER VISIT SUMMARY FROM 6/18/21 WAS RETURNED. ADDRESS ON [address] RETURNED MAIL. WRITER HAD TO LEAVE A VM, AS PATIENT DID NOT ANSWER. WRITER WILL TRY TO CALL AGAIN.

## 2021-08-26 ENCOUNTER — TELEPHONE (OUTPATIENT)
Dept: ONCOLOGY | Age: 26
End: 2021-08-26

## 2021-11-02 ENCOUNTER — TELEPHONE (OUTPATIENT)
Dept: ONCOLOGY | Age: 26
End: 2021-11-02

## 2022-01-18 ENCOUNTER — TELEPHONE (OUTPATIENT)
Dept: ONCOLOGY | Age: 27
End: 2022-01-18

## 2022-01-18 NOTE — TELEPHONE ENCOUNTER
#2 LEFT ANOTHER VM FOR PT TO SCHEDULE F/U FOR MID June 2022 WITH DR Shaila Bianchi. (LABS CBC & IRON STUDIES PRIOR TO RV)

## 2022-06-13 ENCOUNTER — HOSPITAL ENCOUNTER (OUTPATIENT)
Age: 27
Discharge: HOME | End: 2022-06-13

## 2022-06-13 DIAGNOSIS — D50.9 IRON DEFICIENCY ANEMIA, UNSPECIFIED IRON DEFICIENCY ANEMIA TYPE: ICD-10-CM

## 2022-06-13 DIAGNOSIS — D69.1 DELTA STORAGE POOL DISEASE (HCC): ICD-10-CM

## 2022-06-13 LAB
ABSOLUTE EOS #: 0.09 K/UL (ref 0–0.44)
ABSOLUTE IMMATURE GRANULOCYTE: 0.01 K/UL (ref 0–0.3)
ABSOLUTE LYMPH #: 2.36 K/UL (ref 1.1–3.7)
ABSOLUTE MONO #: 0.39 K/UL (ref 0.1–1.2)
BASOPHILS # BLD: 1 % (ref 0–2)
BASOPHILS ABSOLUTE: 0.05 K/UL (ref 0–0.2)
EOSINOPHILS RELATIVE PERCENT: 1 % (ref 1–4)
FERRITIN: 23 NG/ML (ref 13–150)
HCT VFR BLD CALC: 39.4 % (ref 36.3–47.1)
HEMOGLOBIN: 12.9 G/DL (ref 11.9–15.1)
IMMATURE GRANULOCYTES: 0 %
IRON SATURATION: 36 % (ref 20–55)
IRON: 136 UG/DL (ref 37–145)
LYMPHOCYTES # BLD: 34 % (ref 24–43)
MCH RBC QN AUTO: 31.2 PG (ref 25.2–33.5)
MCHC RBC AUTO-ENTMCNC: 32.7 G/DL (ref 28.4–34.8)
MCV RBC AUTO: 95.2 FL (ref 82.6–102.9)
MONOCYTES # BLD: 6 % (ref 3–12)
NRBC AUTOMATED: 0 PER 100 WBC
PDW BLD-RTO: 11.9 % (ref 11.8–14.4)
PLATELET # BLD: 323 K/UL (ref 138–453)
PMV BLD AUTO: 10.3 FL (ref 8.1–13.5)
RBC # BLD: 4.14 M/UL (ref 3.95–5.11)
SEG NEUTROPHILS: 58 % (ref 36–65)
SEGMENTED NEUTROPHILS ABSOLUTE COUNT: 4.1 K/UL (ref 1.5–8.1)
TOTAL IRON BINDING CAPACITY: 381 UG/DL (ref 250–450)
UNSATURATED IRON BINDING CAPACITY: 245 UG/DL (ref 112–347)
WBC # BLD: 7 K/UL (ref 3.5–11.3)

## 2022-06-13 PROCEDURE — 82728 ASSAY OF FERRITIN: CPT

## 2022-06-13 PROCEDURE — 83540 ASSAY OF IRON: CPT

## 2022-06-13 PROCEDURE — 83550 IRON BINDING TEST: CPT

## 2022-06-13 PROCEDURE — 36415 COLL VENOUS BLD VENIPUNCTURE: CPT

## 2022-06-13 PROCEDURE — 85025 COMPLETE CBC W/AUTO DIFF WBC: CPT

## 2022-06-18 ENCOUNTER — HOSPITAL ENCOUNTER (OUTPATIENT)
Facility: MEDICAL CENTER | Age: 27
End: 2022-06-18

## 2022-06-20 ENCOUNTER — TELEPHONE (OUTPATIENT)
Dept: ONCOLOGY | Age: 27
End: 2022-06-20

## 2022-06-20 ENCOUNTER — OFFICE VISIT (OUTPATIENT)
Dept: ONCOLOGY | Age: 27
End: 2022-06-20

## 2022-06-20 VITALS
BODY MASS INDEX: 31.89 KG/M2 | WEIGHT: 177.2 LBS | HEART RATE: 70 BPM | RESPIRATION RATE: 16 BRPM | DIASTOLIC BLOOD PRESSURE: 73 MMHG | SYSTOLIC BLOOD PRESSURE: 118 MMHG | TEMPERATURE: 98.7 F

## 2022-06-20 DIAGNOSIS — D50.9 IRON DEFICIENCY ANEMIA, UNSPECIFIED IRON DEFICIENCY ANEMIA TYPE: Primary | ICD-10-CM

## 2022-06-20 DIAGNOSIS — K90.9 IRON MALABSORPTION: ICD-10-CM

## 2022-06-20 DIAGNOSIS — N92.1 MENORRHAGIA WITH IRREGULAR CYCLE: ICD-10-CM

## 2022-06-20 DIAGNOSIS — D69.1 DELTA STORAGE POOL DISEASE (HCC): Chronic | ICD-10-CM

## 2022-06-20 PROCEDURE — 99202 OFFICE O/P NEW SF 15 MIN: CPT

## 2022-06-20 PROCEDURE — 99202 OFFICE O/P NEW SF 15 MIN: CPT | Performed by: INTERNAL MEDICINE

## 2022-06-20 PROCEDURE — 99214 OFFICE O/P EST MOD 30 MIN: CPT | Performed by: INTERNAL MEDICINE

## 2022-06-20 RX ORDER — FERROUS GLUCONATE 324(37.5)
324 TABLET ORAL 2 TIMES DAILY
Qty: 60 TABLET | Refills: 3 | Status: SHIPPED | OUTPATIENT
Start: 2022-06-20

## 2022-06-20 NOTE — PROGRESS NOTES
DIAGNOSIS:   1. Anemia  2. Menorrhagia  3. Low B12   4. possible bleeding disorder, but work-up essentially is negative. CURRENT THERAPY:  Plan for oral iron and B12  Work-up is negative for bleeding disorder  Good response to IV iron  Oral iron     BRIEF CASE HISTORY:   Dipti Miguel is a very pleasant 31 y.o. female who is referred to us for anemia. She reports she has history of low iron. She had hematuria as a ped and diagnosed with bleeding disorder. She has not had any hematuria or hematochezia as an adult. She denies any epistaxis or bleeding with brushing. She does feel she clots slowly with cuts. She has not been hospitalized with bleeding or received transfusion. She has history of menorrhagia with clotting, her cycles are every 25 days, she is not currently on birth control or using IUD, no uterine ablation, not currently trying to get pregnant. She follows vegan diet, was not taking B12. The patient was started on oral B12 and iron. She underwent a work-up for bleeding disorder, von Willebrand disease was excluded, platelet function was normal.  It was thought that her menorrhagia is due to local causes or hormone issues rather than bleeding dysfunction   Patient received oral iron for about 3 to 4 months without response so we offered her IV iron. INTERIM HISTORY: The patient comes in today for a follow-up to review lab work with anemia. She is not currently taking oral iron. Her menorrhagia persists with 14 day cycles, she consulted with [facility], hormone birth control was offered, she is not interested in systemic hormone therapy, she was given tranexamic acid but felt is worsened her bleeding and discontinued.      PAST MEDICAL HISTORY: has a past medical history of Delta storage pool disease [facility]), Depression, Hematuria, History of chest pain, Hyperinsulinism, Hypertension, Insulin resistance, Leg lesion, Microcytic anemia, Obesity, Pain, and S/P laparoscopic sleeve gastrectomy. PAST SURGICAL HISTORY: has a past surgical history that includes Tonsillectomy; other surgical history (12/22/14); and Bariatric Surgery. CURRENT MEDICATIONS:  currently has no medications in their medication list.    ALLERGIES:  is allergic to pcn [penicillins]. FAMILY HISTORY: Negative for any hematological or oncological conditions. SOCIAL HISTORY:  reports that she quit smoking about 8 years ago. Her smoking use included cigarettes. She has a 0.50 pack-year smoking history. She has never used smokeless tobacco. She reports current alcohol use. She reports that she does not use drugs. REVIEW OF SYSTEMS:   General: No fever or night sweats. Weight is stable. ENT: No double or blurred vision, no tinnitus or hearing problem, no dysphagia or sore throat   Respiratory: No chest pain, no shortness of breath, no cough or hemoptysis. Cardiovascular: Denies chest pain, PND or orthopnea. No L E swelling or palpitations. Gastrointestinal: No nausea or vomiting, abdominal pain, diarrhea or constipation. Genitourinary: Denies dysuria, hematuria, frequency, urgency or incontinence. Gynecology: + menorrhagia   Neurological: Denies headaches, decreased LOC, no sensory or motor focal deficits. Musculoskeletal: No arthralgia no back pain or joint swelling. Skin: There are no rashes or bleeding. Psychiatric: No anxiety, no depression. Endocrine: No diabetes or thyroid disease. Hematologic: No bleeding, no adenopathy. PHYSICAL EXAM: Shows a well appearing 31y.o.-year-old female who is not in pain or distress. Vital Signs: Blood pressure 118/73, pulse 70, temperature 98.7 °F (37.1 °C), temperature source Temporal, resp. rate 16, weight 177 lb 3.2 oz (80.4 kg), not currently breastfeeding. HEENT: Normocephalic and atraumatic. Pupils are equal, round, reactive to light and accommodation. Extraocular muscles are intact. Neck: Showed no JVD, no carotid bruit .  Lungs: Clear to auscultation bilaterally. Heart: Regular without any murmur. Abdomen: Soft, nontender. No hepatosplenomegaly. Extremities: Lower extremities show no edema, clubbing, or cyanosis. Breasts: Examination not done today. Neuro exam: intact cranial nerves bilaterally no motor or sensory deficit, gait is normal. Lymphatic: no adenopathy appreciated in the supraclavicular, axillary, cervical or inguinal area    REVIEW OF LABORATORY DATA:   Lab Results   Component Value Date    WBC 7.0 06/13/2022    HGB 12.9 06/13/2022    HCT 39.4 06/13/2022    MCV 95.2 06/13/2022     06/13/2022       Chemistry        Component Value Date/Time     11/10/2020 0810    K 3.8 11/10/2020 0810     11/10/2020 0810    CO2 21 11/10/2020 0810    BUN 8 11/10/2020 0810    CREATININE 0.58 11/10/2020 0810        Component Value Date/Time    CALCIUM 8.9 11/10/2020 0810    ALKPHOS 48 11/10/2020 0810    AST 40 (H) 11/10/2020 0810    ALT 38 (H) 11/10/2020 0810    BILITOT 0.48 11/10/2020 0810        Lab Results   Component Value Date    IRON 136 06/13/2022    TIBC 381 06/13/2022    FERRITIN 23 06/13/2022     Lab Results   Component Value Date    SXPXEVCJ01 428 03/19/2021         REVIEW OF RADIOLOGICAL RESULTS:     IMPRESSION:   1. Iron deficiency anemia  2. Menorrhagia  3. Low B12  4. No response to oral iron, plan IV iron    PLAN:   1. Her lab work was reviewed, her HGB has normalized, ferritin is decreasing, counts in range. 2. I cannot offer IV iron at this time with her HGB in range and advised her to take oral iron and I am putting in orders. 3. We will plan to repeat lab work in 2 months or sooner if she becomes symptomatic. 4. Her menorrhagia persists despite intervention, she is not interested in systemic hormone therapy and felt tranexamic acid was ineffective, I will discuss with MARS. 5. Return in 2 months.        Scribe Attestation   This note was created by Gualberto iMller acting as scribe for the physician signing this note  Electronically Signed  Enedelia Morales, 6/20/2022  Scribe, [employer]. Attending Attestation   Note was reviewed and edited.   I am in agreement with the note as franco SAMUEL [facility] MD Bola  Hematologist/Medical [id number] [place] [address] hematology oncology physicians

## 2022-06-20 NOTE — TELEPHONE ENCOUNTER
Kassidy Stout MD VISIT  rv in 2 months with cbc and iron studies prior   LABS CDP FE TIBC FERRITIN ORDERS GIVEN TO PT ON EXIT TO BE DONE 8/5/22  MD VISIT 8/12/22 @ 11AM  SCRIPTS SENT TO PT PHARMACY  AVS  PRINTED W/ INSTRUCTIONS AND GIVEN TO PT ON EXIT

## 2022-08-10 ENCOUNTER — HOSPITAL ENCOUNTER (OUTPATIENT)
Facility: MEDICAL CENTER | Age: 27
End: 2022-08-10

## 2023-01-04 ENCOUNTER — APPOINTMENT (OUTPATIENT)
Dept: ULTRASOUND IMAGING | Age: 28
End: 2023-01-04

## 2023-01-04 ENCOUNTER — HOSPITAL ENCOUNTER (EMERGENCY)
Age: 28
Discharge: HOME | End: 2023-01-04
Attending: EMERGENCY MEDICINE

## 2023-01-04 VITALS
SYSTOLIC BLOOD PRESSURE: 114 MMHG | RESPIRATION RATE: 16 BRPM | OXYGEN SATURATION: 99 % | TEMPERATURE: 98.7 F | HEIGHT: 62 IN | BODY MASS INDEX: 30.36 KG/M2 | DIASTOLIC BLOOD PRESSURE: 75 MMHG | HEART RATE: 73 BPM | WEIGHT: 165 LBS

## 2023-01-04 DIAGNOSIS — R10.32 LEFT LOWER QUADRANT ABDOMINAL PAIN: Primary | ICD-10-CM

## 2023-01-04 LAB
ABSOLUTE EOS #: 0.14 K/UL (ref 0–0.44)
ABSOLUTE IMMATURE GRANULOCYTE: 0.02 K/UL (ref 0–0.3)
ABSOLUTE LYMPH #: 1.24 K/UL (ref 1.1–3.7)
ABSOLUTE MONO #: 0.24 K/UL (ref 0.1–1.2)
ALBUMIN SERPL-MCNC: 4.5 G/DL (ref 3.5–5.2)
ALP BLD-CCNC: 54 U/L (ref 35–104)
ALT SERPL-CCNC: 10 U/L (ref 5–33)
ANION GAP SERPL CALCULATED.3IONS-SCNC: 10 MMOL/L (ref 9–17)
AST SERPL-CCNC: 17 U/L
BASOPHILS # BLD: 1 % (ref 0–2)
BASOPHILS ABSOLUTE: 0.04 K/UL (ref 0–0.2)
BILIRUB SERPL-MCNC: 0.6 MG/DL (ref 0.3–1.2)
BILIRUBIN DIRECT: 0.1 MG/DL
BILIRUBIN URINE: NEGATIVE
BILIRUBIN, INDIRECT: 0.5 MG/DL (ref 0–1)
BUN BLDV-MCNC: 10 MG/DL (ref 6–20)
BUN/CREAT BLD: 18 (ref 9–20)
CALCIUM SERPL-MCNC: 9.6 MG/DL (ref 8.6–10.4)
CHLORIDE BLD-SCNC: 103 MMOL/L (ref 98–107)
CO2: 25 MMOL/L (ref 20–31)
COLOR: YELLOW
CREAT SERPL-MCNC: 0.56 MG/DL (ref 0.5–0.9)
EOSINOPHILS RELATIVE PERCENT: 3 % (ref 1–4)
EPITHELIAL CELLS UA: NORMAL /HPF (ref 0–5)
GFR SERPL CREATININE-BSD FRML MDRD: >60 ML/MIN/1.73M2
GLUCOSE BLD-MCNC: 88 MG/DL (ref 70–99)
GLUCOSE URINE: NEGATIVE
HCG QUALITATIVE: NEGATIVE
HCT VFR BLD CALC: 40.7 % (ref 36.3–47.1)
HEMOGLOBIN: 13.2 G/DL (ref 11.9–15.1)
IMMATURE GRANULOCYTES: 0 %
KETONES, URINE: NEGATIVE
LEUKOCYTE ESTERASE, URINE: NEGATIVE
LIPASE: 28 U/L (ref 13–60)
LYMPHOCYTES # BLD: 26 % (ref 24–43)
MCH RBC QN AUTO: 29.9 PG (ref 25.2–33.5)
MCHC RBC AUTO-ENTMCNC: 32.4 G/DL (ref 28.4–34.8)
MCV RBC AUTO: 92.3 FL (ref 82.6–102.9)
MONOCYTES # BLD: 5 % (ref 3–12)
NITRITE, URINE: NEGATIVE
NRBC AUTOMATED: 0 PER 100 WBC
PDW BLD-RTO: 12 % (ref 11.8–14.4)
PH UA: 7 (ref 5–8)
PLATELET # BLD: 297 K/UL (ref 138–453)
PMV BLD AUTO: 10.3 FL (ref 8.1–13.5)
POTASSIUM SERPL-SCNC: 3.7 MMOL/L (ref 3.7–5.3)
PROTEIN UA: NEGATIVE
RBC # BLD: 4.41 M/UL (ref 3.95–5.11)
RBC UA: NORMAL /HPF (ref 0–2)
SEG NEUTROPHILS: 65 % (ref 36–65)
SEGMENTED NEUTROPHILS ABSOLUTE COUNT: 3.06 K/UL (ref 1.5–8.1)
SODIUM BLD-SCNC: 138 MMOL/L (ref 135–144)
SPECIFIC GRAVITY UA: 1.01 (ref 1–1.03)
TOTAL PROTEIN: 6.9 G/DL (ref 6.4–8.3)
TURBIDITY: CLEAR
URINE HGB: ABNORMAL
UROBILINOGEN, URINE: NORMAL
WBC # BLD: 4.7 K/UL (ref 3.5–11.3)
WBC UA: NORMAL /HPF (ref 0–5)

## 2023-01-04 PROCEDURE — 2580000003 HC RX 258: Performed by: EMERGENCY MEDICINE

## 2023-01-04 PROCEDURE — 84703 CHORIONIC GONADOTROPIN ASSAY: CPT

## 2023-01-04 PROCEDURE — 76856 US EXAM PELVIC COMPLETE: CPT

## 2023-01-04 PROCEDURE — 76830 TRANSVAGINAL US NON-OB: CPT

## 2023-01-04 PROCEDURE — 85025 COMPLETE CBC W/AUTO DIFF WBC: CPT

## 2023-01-04 PROCEDURE — 93976 VASCULAR STUDY: CPT

## 2023-01-04 PROCEDURE — 99284 EMERGENCY DEPT VISIT MOD MDM: CPT

## 2023-01-04 PROCEDURE — 6360000002 HC RX W HCPCS: Performed by: EMERGENCY MEDICINE

## 2023-01-04 PROCEDURE — 80048 BASIC METABOLIC PNL TOTAL CA: CPT

## 2023-01-04 PROCEDURE — 96375 TX/PRO/DX INJ NEW DRUG ADDON: CPT

## 2023-01-04 PROCEDURE — 96374 THER/PROPH/DIAG INJ IV PUSH: CPT

## 2023-01-04 PROCEDURE — 80076 HEPATIC FUNCTION PANEL: CPT

## 2023-01-04 PROCEDURE — 83690 ASSAY OF LIPASE: CPT

## 2023-01-04 PROCEDURE — 6370000000 HC RX 637 (ALT 250 FOR IP): Performed by: EMERGENCY MEDICINE

## 2023-01-04 PROCEDURE — 81001 URINALYSIS AUTO W/SCOPE: CPT

## 2023-01-04 RX ORDER — 0.9 % SODIUM CHLORIDE 0.9 %
1000 INTRAVENOUS SOLUTION INTRAVENOUS ONCE
Status: COMPLETED | OUTPATIENT
Start: 2023-01-04 | End: 2023-01-04

## 2023-01-04 RX ORDER — KETOROLAC TROMETHAMINE 30 MG/ML
30 INJECTION, SOLUTION INTRAMUSCULAR; INTRAVENOUS ONCE
Status: COMPLETED | OUTPATIENT
Start: 2023-01-04 | End: 2023-01-04

## 2023-01-04 RX ORDER — ONDANSETRON 2 MG/ML
4 INJECTION INTRAMUSCULAR; INTRAVENOUS ONCE
Status: COMPLETED | OUTPATIENT
Start: 2023-01-04 | End: 2023-01-04

## 2023-01-04 RX ORDER — ACETAMINOPHEN 500 MG
1000 TABLET ORAL EVERY 8 HOURS PRN
Qty: 30 TABLET | Refills: 1 | Status: SHIPPED | OUTPATIENT
Start: 2023-01-04

## 2023-01-04 RX ORDER — ONDANSETRON 4 MG/1
4 TABLET, ORALLY DISINTEGRATING ORAL 3 TIMES DAILY PRN
Qty: 21 TABLET | Refills: 0 | Status: SHIPPED | OUTPATIENT
Start: 2023-01-04

## 2023-01-04 RX ADMIN — SODIUM CHLORIDE 1000 ML: 9 INJECTION, SOLUTION INTRAVENOUS at 11:34

## 2023-01-04 RX ADMIN — ALUMINUM HYDROXIDE, MAGNESIUM HYDROXIDE, AND SIMETHICONE: 200; 200; 20 SUSPENSION ORAL at 11:35

## 2023-01-04 RX ADMIN — ONDANSETRON 4 MG: 2 INJECTION INTRAMUSCULAR; INTRAVENOUS at 11:34

## 2023-01-04 RX ADMIN — KETOROLAC TROMETHAMINE 30 MG: 30 INJECTION, SOLUTION INTRAMUSCULAR; INTRAVENOUS at 12:34

## 2023-01-04 ASSESSMENT — ENCOUNTER SYMPTOMS
COLOR CHANGE: 0
RHINORRHEA: 0
DIARRHEA: 0
EYE REDNESS: 0
SHORTNESS OF BREATH: 0
NAUSEA: 0
VOMITING: 0
SORE THROAT: 0
COUGH: 0
ABDOMINAL PAIN: 1
EYE DISCHARGE: 0

## 2023-01-04 ASSESSMENT — PAIN DESCRIPTION - DESCRIPTORS: DESCRIPTORS: DULL;SHARP

## 2023-01-04 ASSESSMENT — PAIN SCALES - GENERAL
PAINLEVEL_OUTOF10: 8
PAINLEVEL_OUTOF10: 10

## 2023-01-04 ASSESSMENT — PAIN DESCRIPTION - FREQUENCY: FREQUENCY: CONTINUOUS

## 2023-01-04 ASSESSMENT — PAIN DESCRIPTION - LOCATION: LOCATION: ABDOMEN

## 2023-01-04 ASSESSMENT — PAIN - FUNCTIONAL ASSESSMENT: PAIN_FUNCTIONAL_ASSESSMENT: 0-10

## 2023-01-04 NOTE — ED NOTES
Patient ambulated to bathroom without issue, urine specimen obtained, patient returned to room/bed, bolus restarted until completed, patient call light within reach.       Yessenia Mesa, RN  90/21/35 [id number]

## 2023-01-04 NOTE — ED PROVIDER NOTES
EMERGENCY DEPARTMENT ENCOUNTER    Pt Name: Yfn Cheatham  MRN: [id number]  [place] 1995  Date of evaluation: 1/4/23  CHIEF COMPLAINT       Chief Complaint   Patient presents with    Abdominal Pain     Onset 5 days     HISTORY OF PRESENT ILLNESS   This is a 29-year-old female that presents with complaints of pain and discomfort in her left abdomen. Patient states her symptoms have been ongoing for the past 5 days. She denies any associated nausea or vomiting, no fevers or chills, she denies dysuria hematuria, she denies any vaginal bleeding or discharge. The patient describes her symptoms as severe, without any specific alleviating factors. Pain is aggravated by laughing and movement. She states the pain is constant with intermittent episodes of increased pain. She is currently menstruating, her period arrived at her normal time. REVIEW OF SYSTEMS     Review of Systems   Constitutional:  Negative for chills and fever. HENT:  Negative for rhinorrhea and sore throat. Eyes:  Negative for discharge, redness and visual disturbance. Respiratory:  Negative for cough and shortness of breath. Cardiovascular:  Negative for chest pain, palpitations and leg swelling. Gastrointestinal:  Positive for abdominal pain. Negative for diarrhea, nausea and vomiting. Genitourinary:  Negative for dysuria and hematuria. Musculoskeletal:  Negative for arthralgias, myalgias and neck pain. Skin:  Negative for color change and rash. Neurological:  Negative for seizures, weakness and headaches. Psychiatric/Behavioral:  Negative for hallucinations, self-injury and suicidal ideas. PASTMEDICAL HISTORY     Past Medical History:   Diagnosis Date    Delta storage pool disease ([facility] [address] 5/2011    1.36 DG/PL/PT STATES RECEIVING PLATELETS PREOP PER DR. Sofia Wilde    Depression     Hematuria     History of chest pain     Hyperinsulinism     Hypertension     Insulin resistance     Leg lesion     Microcytic anemia Obesity     Pain     RIGHT KNEE/HX DISLOCATION SEVERAL TIMES    S/P laparoscopic sleeve gastrectomy 14    start wt = 237lb, Dr. Yoel Matute     Past Problem List  Patient Active Problem List   Diagnosis Code    Delta storage pool disease D69.1    Obesity E66.9    Depression F32. A    Hematuria R31.9    S/P laparoscopic sleeve gastrectomy Z98.84    Self-induced purging for weight loss F50.9    Iron deficiency anemia D50.9    Myopia H52.10    Severe recurrent major depression without psychotic features ([address] F33.2    Suicide gesture ([address] Tuesday.Sleight. 8XXA     SURGICAL HISTORY       Past Surgical History:   Procedure Laterality Date    BARIATRIC SURGERY      OTHER SURGICAL HISTORY  14    laparascopic Robotic Gastrectomy sleeve    TONSILLECTOMY       CURRENT MEDICATIONS       Previous Medications    FERROUS GLUCONATE 324 (37.5 FE) MG TABS    Take 1 tablet by mouth 2 times daily     ALLERGIES     is allergic to pcn [penicillins]. FAMILY HISTORY     She indicated that her mother is alive. She indicated that her father is alive. She indicated that the status of her sister is unknown. She indicated that her maternal grandmother is . She indicated that her maternal grandfather is . She indicated that her paternal grandmother is . She indicated that her paternal grandfather is . She indicated that the status of her maternal aunt is unknown.      SOCIAL HISTORY       Social History     Tobacco Use    Smoking status: Former     Packs/day: 0.50     Years: 1.00     Pack years: 0.50     Types: Cigarettes     Quit date: 2014     Years since quittin.6    Smokeless tobacco: Never   Vaping Use    Vaping Use: Never used   Substance Use Topics    Alcohol use: Yes     Comment: social    Drug use: No     PHYSICAL EXAM     INITIAL VITALS: /75   Pulse 73   Temp 98.7 °F (37.1 °C) (Oral)   Resp 16   Ht 5' 2\" (1.575 m)   Wt 165 lb (74.8 kg)   LMP 2023   SpO2 99%   BMI 30.18 kg/m²    Physical Exam  Constitutional:       Appearance: Normal appearance. She is well-developed. She is not ill-appearing or toxic-appearing. HENT:      Head: Normocephalic and atraumatic. Eyes:      Conjunctiva/sclera: Conjunctivae normal.      Pupils: Pupils are equal, round, and reactive to light. Neck:      Trachea: Trachea normal.   Cardiovascular:      Rate and Rhythm: Normal rate and regular rhythm. Heart sounds: S1 normal and S2 normal. No murmur heard. Pulmonary:      Effort: Pulmonary effort is normal. No accessory muscle usage or respiratory distress. Breath sounds: Normal breath sounds. Chest:      Chest wall: No deformity or tenderness. Abdominal:      General: Bowel sounds are normal. There is no distension or abdominal bruit. Palpations: Abdomen is not rigid. Tenderness: There is abdominal tenderness in the left upper quadrant and left lower quadrant. There is no guarding or rebound. Negative signs include Bartholomew's sign and McBurney's sign. Musculoskeletal:      Cervical back: Normal range of motion and neck supple. Skin:     General: Skin is warm. Findings: No rash. Neurological:      Mental Status: She is alert and oriented to person, place, and time. GCS: GCS eye subscore is 4. GCS verbal subscore is 5. GCS motor subscore is 6. Psychiatric:         Speech: Speech normal.       MEDICAL DECISION MAKING / ED COURSE:   Summary of Patient Presentation:    23-year-old female presents with complaints of left-sided abdominal pain, plan is basic labs IV fluids pain control and reevaluation. 1)  Number and Complexity of Problems  Problem List This Visit: Nominal pain    Differential Diagnosis: Enteritis, ovarian cyst, ovarian torsion, diverticulitis. Diagnoses Considered but Do Not Suspect:  To colitis    Pertinent Comorbid Conditions:      2)  Data Reviewed  Patient's laboratory studies were unremarkable  See more data below for the lab and radiology tests and orders. 3)  Treatment and Disposition    Patient's repeat assessment shows some tenderness to palpation in the left lower quadrant but no evidence of an acute surgical abdomen. The patient's ultrasound was unremarkable. Plan is discharge with outpatient follow-up, short course of pain control antiemetics and return if symptoms worsen or change. \"ED Course\" Notes From Epic Narrator:         CRITICAL CARE:           DATA FOR LAB AND RADIOLOGY TESTS ORDERED BELOW ARE REVIEWED BY THE ED CLINICIAN:    RADIOLOGY: All x-rays, CT, MRI, and formal ultrasound images (except ED bedside ultrasound) are read by the radiologist, see reports below, unless otherwise noted in MDM or here. Reports below are reviewed by myself. US PELVIS COMPLETE   Final Result   1. Normal ovaries with normal arterial and venous Doppler flow. 2. Anteverted uterus. Possible nabothian cysts in the cervix. 3. Endometrial stripe thickness measures 7 mm, within normal limits. US NON OB TRANSVAGINAL   Final Result   1. Normal ovaries with normal arterial and venous Doppler flow. 2. Anteverted uterus. Possible nabothian cysts in the cervix. 3. Endometrial stripe thickness measures 7 mm, within normal limits. US DUP ABD PEL RETRO SCROT LIMITED   Final Result   1. Normal ovaries with normal arterial and venous Doppler flow. 2. Anteverted uterus. Possible nabothian cysts in the cervix. 3. Endometrial stripe thickness measures 7 mm, within normal limits. LABS: Lab orders shown below, the results are reviewed by myself, and all abnormals are listed below.   Labs Reviewed   URINALYSIS - Abnormal; Notable for the following components:       Result Value    Urine Hgb 2+ (*)     All other components within normal limits   BASIC METABOLIC PANEL   CBC WITH AUTO DIFFERENTIAL   HEPATIC FUNCTION PANEL   LIPASE   HCG, SERUM, QUALITATIVE   MICROSCOPIC URINALYSIS       Vitals Reviewed:    Vitals: 01/04/23 1048   BP: 114/75   Pulse: 73   Resp: 16   Temp: 98.7 °F (37.1 °C)   TempSrc: Oral   SpO2: 99%   Weight: 165 lb (74.8 kg)   Height: 5' 2\" (1.575 m)     MEDICATIONS GIVEN TO PATIENT THIS ENCOUNTER:  Orders Placed This Encounter   Medications    ondansetron (ZOFRAN) injection 4 mg    0.9 % sodium chloride bolus    aluminum & magnesium hydroxide-simethicone (MAALOX) 30 mL, lidocaine viscous hcl (XYLOCAINE) 5 mL (GI COCKTAIL)    ketorolac (TORADOL) injection 30 mg    ondansetron (ZOFRAN-ODT) 4 MG disintegrating tablet     Sig: Take 1 tablet by mouth 3 times daily as needed for Nausea or Vomiting     Dispense:  21 tablet     Refill:  0    acetaminophen (TYLENOL) 500 MG tablet     Sig: Take 2 tablets by mouth every 8 hours as needed for Pain     Dispense:  30 tablet     Refill:  1     DISCHARGE PRESCRIPTIONS:  New Prescriptions    ACETAMINOPHEN (TYLENOL) 500 MG TABLET    Take 2 tablets by mouth every 8 hours as needed for Pain    ONDANSETRON (ZOFRAN-ODT) 4 MG DISINTEGRATING TABLET    Take 1 tablet by mouth 3 times daily as needed for Nausea or Vomiting     PHYSICIAN CONSULTS ORDERED THIS ENCOUNTER:  None  FINAL IMPRESSION      1.  Left lower quadrant abdominal pain          DISPOSITION/PLAN   DISPOSITION Decision To Discharge 01/04/2023 02:21:02 PM      OUTPATIENT FOLLOW UP THE PATIENT:  [facility] - [facility]  [address] [place] [address]  [phone]    Schedule an appointment as soon as possible for a visit in 2 days      MD Justin Leon MD  01/04/23 1876

## 2023-01-04 NOTE — ED NOTES
Patient presents to ED for LUQ pain she states started 5 days ago, no N/V, is a burning-type pain, no history of GI issues.       Zuleika Sheppard., RN  60/31/96 1055